# Patient Record
Sex: MALE | Race: BLACK OR AFRICAN AMERICAN | Employment: UNEMPLOYED | URBAN - METROPOLITAN AREA
[De-identification: names, ages, dates, MRNs, and addresses within clinical notes are randomized per-mention and may not be internally consistent; named-entity substitution may affect disease eponyms.]

---

## 2018-03-24 ENCOUNTER — APPOINTMENT (OUTPATIENT)
Dept: RADIOLOGY | Facility: MEDICAL CENTER | Age: 63
DRG: 637 | End: 2018-03-24
Attending: EMERGENCY MEDICINE
Payer: MEDICAID

## 2018-03-24 ENCOUNTER — HOSPITAL ENCOUNTER (INPATIENT)
Facility: MEDICAL CENTER | Age: 63
LOS: 3 days | DRG: 637 | End: 2018-03-27
Attending: EMERGENCY MEDICINE | Admitting: HOSPITALIST
Payer: MEDICAID

## 2018-03-24 ENCOUNTER — RESOLUTE PROFESSIONAL BILLING HOSPITAL PROF FEE (OUTPATIENT)
Dept: HOSPITALIST | Facility: MEDICAL CENTER | Age: 63
End: 2018-03-24
Payer: MEDICAID

## 2018-03-24 DIAGNOSIS — R73.9 HYPERGLYCEMIA: ICD-10-CM

## 2018-03-24 DIAGNOSIS — R20.0 RIGHT ARM NUMBNESS: ICD-10-CM

## 2018-03-24 LAB
ALBUMIN SERPL BCP-MCNC: 4.4 G/DL (ref 3.2–4.9)
ALBUMIN/GLOB SERPL: 1.4 G/DL
ALP SERPL-CCNC: 100 U/L (ref 30–99)
ALT SERPL-CCNC: 20 U/L (ref 2–50)
ANION GAP SERPL CALC-SCNC: 13 MMOL/L (ref 0–11.9)
APPEARANCE UR: CLEAR
APTT PPP: 23.8 SEC (ref 24.7–36)
AST SERPL-CCNC: 13 U/L (ref 12–45)
B-OH-BUTYR SERPL-MCNC: 1.24 MMOL/L (ref 0.02–0.27)
BASOPHILS # BLD AUTO: 0.5 % (ref 0–1.8)
BASOPHILS # BLD: 0.06 K/UL (ref 0–0.12)
BILIRUB SERPL-MCNC: 0.5 MG/DL (ref 0.1–1.5)
BILIRUB UR QL STRIP.AUTO: NEGATIVE
BUN SERPL-MCNC: 12 MG/DL (ref 8–22)
CALCIUM SERPL-MCNC: 9.5 MG/DL (ref 8.5–10.5)
CHLORIDE SERPL-SCNC: 90 MMOL/L (ref 96–112)
CO2 SERPL-SCNC: 20 MMOL/L (ref 20–33)
COLOR UR: YELLOW
CREAT SERPL-MCNC: 1.03 MG/DL (ref 0.5–1.4)
CULTURE IF INDICATED INDCX: NO UA CULTURE
EKG IMPRESSION: NORMAL
EOSINOPHIL # BLD AUTO: 0.05 K/UL (ref 0–0.51)
EOSINOPHIL NFR BLD: 0.5 % (ref 0–6.9)
ERYTHROCYTE [DISTWIDTH] IN BLOOD BY AUTOMATED COUNT: 41.6 FL (ref 35.9–50)
EST. AVERAGE GLUCOSE BLD GHB EST-MCNC: 413 MG/DL
GLOBULIN SER CALC-MCNC: 3.2 G/DL (ref 1.9–3.5)
GLUCOSE BLD-MCNC: 377 MG/DL (ref 65–99)
GLUCOSE BLD-MCNC: 384 MG/DL (ref 65–99)
GLUCOSE SERPL-MCNC: 995 MG/DL (ref 65–99)
GLUCOSE UR STRIP.AUTO-MCNC: >=1000 MG/DL
HBA1C MFR BLD: 16 % (ref 0–5.6)
HCT VFR BLD AUTO: 46.5 % (ref 42–52)
HGB BLD-MCNC: 15.5 G/DL (ref 14–18)
IMM GRANULOCYTES # BLD AUTO: 0.04 K/UL (ref 0–0.11)
IMM GRANULOCYTES NFR BLD AUTO: 0.4 % (ref 0–0.9)
INR PPP: 0.95 (ref 0.87–1.13)
KETONES UR STRIP.AUTO-MCNC: ABNORMAL MG/DL
LEUKOCYTE ESTERASE UR QL STRIP.AUTO: NEGATIVE
LYMPHOCYTES # BLD AUTO: 3.01 K/UL (ref 1–4.8)
LYMPHOCYTES NFR BLD: 27.1 % (ref 22–41)
MCH RBC QN AUTO: 31.4 PG (ref 27–33)
MCHC RBC AUTO-ENTMCNC: 33.3 G/DL (ref 33.7–35.3)
MCV RBC AUTO: 94.3 FL (ref 81.4–97.8)
MICRO URNS: ABNORMAL
MONOCYTES # BLD AUTO: 0.85 K/UL (ref 0–0.85)
MONOCYTES NFR BLD AUTO: 7.7 % (ref 0–13.4)
NEUTROPHILS # BLD AUTO: 7.1 K/UL (ref 1.82–7.42)
NEUTROPHILS NFR BLD: 63.8 % (ref 44–72)
NITRITE UR QL STRIP.AUTO: NEGATIVE
NRBC # BLD AUTO: 0 K/UL
NRBC BLD-RTO: 0 /100 WBC
PH UR STRIP.AUTO: 5 [PH]
PLATELET # BLD AUTO: 133 K/UL (ref 164–446)
PMV BLD AUTO: 14.5 FL (ref 9–12.9)
POTASSIUM SERPL-SCNC: 4.6 MMOL/L (ref 3.6–5.5)
PROT SERPL-MCNC: 7.6 G/DL (ref 6–8.2)
PROT UR QL STRIP: NEGATIVE MG/DL
PROTHROMBIN TIME: 12.4 SEC (ref 12–14.6)
RBC # BLD AUTO: 4.93 M/UL (ref 4.7–6.1)
RBC UR QL AUTO: NEGATIVE
SODIUM SERPL-SCNC: 123 MMOL/L (ref 135–145)
SP GR UR STRIP.AUTO: 1.04
TROPONIN I SERPL-MCNC: <0.01 NG/ML (ref 0–0.04)
TSH SERPL DL<=0.005 MIU/L-ACNC: 1.31 UIU/ML (ref 0.38–5.33)
UROBILINOGEN UR STRIP.AUTO-MCNC: 0.2 MG/DL
WBC # BLD AUTO: 11.1 K/UL (ref 4.8–10.8)

## 2018-03-24 PROCEDURE — 84484 ASSAY OF TROPONIN QUANT: CPT

## 2018-03-24 PROCEDURE — 85025 COMPLETE CBC W/AUTO DIFF WBC: CPT | Mod: 91

## 2018-03-24 PROCEDURE — 80053 COMPREHEN METABOLIC PANEL: CPT

## 2018-03-24 PROCEDURE — 770020 HCHG ROOM/CARE - TELE (206)

## 2018-03-24 PROCEDURE — 99285 EMERGENCY DEPT VISIT HI MDM: CPT

## 2018-03-24 PROCEDURE — 82962 GLUCOSE BLOOD TEST: CPT

## 2018-03-24 PROCEDURE — 700102 HCHG RX REV CODE 250 W/ 637 OVERRIDE(OP): Performed by: HOSPITALIST

## 2018-03-24 PROCEDURE — 96374 THER/PROPH/DIAG INJ IV PUSH: CPT

## 2018-03-24 PROCEDURE — 99223 1ST HOSP IP/OBS HIGH 75: CPT | Performed by: HOSPITALIST

## 2018-03-24 PROCEDURE — 70498 CT ANGIOGRAPHY NECK: CPT

## 2018-03-24 PROCEDURE — 700117 HCHG RX CONTRAST REV CODE 255: Performed by: EMERGENCY MEDICINE

## 2018-03-24 PROCEDURE — 85610 PROTHROMBIN TIME: CPT

## 2018-03-24 PROCEDURE — 700102 HCHG RX REV CODE 250 W/ 637 OVERRIDE(OP): Performed by: EMERGENCY MEDICINE

## 2018-03-24 PROCEDURE — 83036 HEMOGLOBIN GLYCOSYLATED A1C: CPT

## 2018-03-24 PROCEDURE — 36415 COLL VENOUS BLD VENIPUNCTURE: CPT

## 2018-03-24 PROCEDURE — 80048 BASIC METABOLIC PNL TOTAL CA: CPT

## 2018-03-24 PROCEDURE — 85730 THROMBOPLASTIN TIME PARTIAL: CPT

## 2018-03-24 PROCEDURE — 700111 HCHG RX REV CODE 636 W/ 250 OVERRIDE (IP): Performed by: HOSPITALIST

## 2018-03-24 PROCEDURE — 0042T CT-CEREBRAL PERFUSION ANALYSIS: CPT

## 2018-03-24 PROCEDURE — 81003 URINALYSIS AUTO W/O SCOPE: CPT

## 2018-03-24 PROCEDURE — 70450 CT HEAD/BRAIN W/O DYE: CPT

## 2018-03-24 PROCEDURE — 700105 HCHG RX REV CODE 258: Performed by: EMERGENCY MEDICINE

## 2018-03-24 PROCEDURE — 84443 ASSAY THYROID STIM HORMONE: CPT

## 2018-03-24 PROCEDURE — A9270 NON-COVERED ITEM OR SERVICE: HCPCS | Performed by: HOSPITALIST

## 2018-03-24 PROCEDURE — 82010 KETONE BODYS QUAN: CPT

## 2018-03-24 PROCEDURE — 93005 ELECTROCARDIOGRAM TRACING: CPT | Performed by: EMERGENCY MEDICINE

## 2018-03-24 PROCEDURE — 700105 HCHG RX REV CODE 258: Performed by: HOSPITALIST

## 2018-03-24 PROCEDURE — 80061 LIPID PANEL: CPT

## 2018-03-24 PROCEDURE — 71045 X-RAY EXAM CHEST 1 VIEW: CPT

## 2018-03-24 PROCEDURE — 96361 HYDRATE IV INFUSION ADD-ON: CPT

## 2018-03-24 PROCEDURE — 70496 CT ANGIOGRAPHY HEAD: CPT

## 2018-03-24 RX ORDER — HEPARIN SODIUM 5000 [USP'U]/ML
5000 INJECTION, SOLUTION INTRAVENOUS; SUBCUTANEOUS EVERY 8 HOURS
Status: DISCONTINUED | OUTPATIENT
Start: 2018-03-24 | End: 2018-03-27 | Stop reason: HOSPADM

## 2018-03-24 RX ORDER — HYDRALAZINE HYDROCHLORIDE 20 MG/ML
20 INJECTION INTRAMUSCULAR; INTRAVENOUS EVERY 6 HOURS PRN
Status: DISCONTINUED | OUTPATIENT
Start: 2018-03-24 | End: 2018-03-27 | Stop reason: HOSPADM

## 2018-03-24 RX ORDER — GABAPENTIN 300 MG/1
300 CAPSULE ORAL 3 TIMES DAILY
COMMUNITY

## 2018-03-24 RX ORDER — ERGOCALCIFEROL 1.25 MG/1
50000 CAPSULE ORAL
COMMUNITY

## 2018-03-24 RX ORDER — PROMETHAZINE HYDROCHLORIDE 25 MG/1
12.5-25 TABLET ORAL EVERY 4 HOURS PRN
Status: DISCONTINUED | OUTPATIENT
Start: 2018-03-24 | End: 2018-03-27 | Stop reason: HOSPADM

## 2018-03-24 RX ORDER — METOPROLOL SUCCINATE 100 MG/1
100 TABLET, EXTENDED RELEASE ORAL DAILY
COMMUNITY

## 2018-03-24 RX ORDER — ATORVASTATIN CALCIUM 40 MG/1
40 TABLET, FILM COATED ORAL NIGHTLY
COMMUNITY

## 2018-03-24 RX ORDER — POLYETHYLENE GLYCOL 3350 17 G/17G
1 POWDER, FOR SOLUTION ORAL
Status: DISCONTINUED | OUTPATIENT
Start: 2018-03-24 | End: 2018-03-27 | Stop reason: HOSPADM

## 2018-03-24 RX ORDER — METOPROLOL TARTRATE 50 MG/1
50 TABLET, FILM COATED ORAL TWICE DAILY
Status: DISCONTINUED | OUTPATIENT
Start: 2018-03-24 | End: 2018-03-25

## 2018-03-24 RX ORDER — ACETAMINOPHEN 325 MG/1
650 TABLET ORAL EVERY 6 HOURS PRN
Status: DISCONTINUED | OUTPATIENT
Start: 2018-03-24 | End: 2018-03-27 | Stop reason: HOSPADM

## 2018-03-24 RX ORDER — LISINOPRIL 10 MG/1
10 TABLET ORAL DAILY
Status: ON HOLD | COMMUNITY
End: 2018-03-27

## 2018-03-24 RX ORDER — NICOTINE 21 MG/24HR
14 PATCH, TRANSDERMAL 24 HOURS TRANSDERMAL
Status: DISCONTINUED | OUTPATIENT
Start: 2018-03-24 | End: 2018-03-27 | Stop reason: HOSPADM

## 2018-03-24 RX ORDER — BISACODYL 10 MG
10 SUPPOSITORY, RECTAL RECTAL
Status: DISCONTINUED | OUTPATIENT
Start: 2018-03-24 | End: 2018-03-27 | Stop reason: HOSPADM

## 2018-03-24 RX ORDER — PROMETHAZINE HYDROCHLORIDE 25 MG/1
12.5-25 SUPPOSITORY RECTAL EVERY 4 HOURS PRN
Status: DISCONTINUED | OUTPATIENT
Start: 2018-03-24 | End: 2018-03-27 | Stop reason: HOSPADM

## 2018-03-24 RX ORDER — AMOXICILLIN 250 MG
2 CAPSULE ORAL 2 TIMES DAILY
Status: DISCONTINUED | OUTPATIENT
Start: 2018-03-24 | End: 2018-03-27 | Stop reason: HOSPADM

## 2018-03-24 RX ORDER — SODIUM CHLORIDE 9 MG/ML
INJECTION, SOLUTION INTRAVENOUS CONTINUOUS
Status: DISCONTINUED | OUTPATIENT
Start: 2018-03-24 | End: 2018-03-24

## 2018-03-24 RX ORDER — ONDANSETRON 2 MG/ML
4 INJECTION INTRAMUSCULAR; INTRAVENOUS EVERY 4 HOURS PRN
Status: DISCONTINUED | OUTPATIENT
Start: 2018-03-24 | End: 2018-03-27 | Stop reason: HOSPADM

## 2018-03-24 RX ORDER — SODIUM CHLORIDE 9 MG/ML
1000 INJECTION, SOLUTION INTRAVENOUS ONCE
Status: COMPLETED | OUTPATIENT
Start: 2018-03-24 | End: 2018-03-24

## 2018-03-24 RX ORDER — DEXTROSE MONOHYDRATE 25 G/50ML
25 INJECTION, SOLUTION INTRAVENOUS
Status: DISCONTINUED | OUTPATIENT
Start: 2018-03-24 | End: 2018-03-27 | Stop reason: HOSPADM

## 2018-03-24 RX ORDER — ONDANSETRON 4 MG/1
4 TABLET, ORALLY DISINTEGRATING ORAL EVERY 4 HOURS PRN
Status: DISCONTINUED | OUTPATIENT
Start: 2018-03-24 | End: 2018-03-27 | Stop reason: HOSPADM

## 2018-03-24 RX ORDER — SODIUM CHLORIDE 9 MG/ML
INJECTION, SOLUTION INTRAVENOUS CONTINUOUS
Status: DISCONTINUED | OUTPATIENT
Start: 2018-03-24 | End: 2018-03-27 | Stop reason: HOSPADM

## 2018-03-24 RX ADMIN — HEPARIN SODIUM 5000 UNITS: 5000 INJECTION, SOLUTION INTRAVENOUS; SUBCUTANEOUS at 22:00

## 2018-03-24 RX ADMIN — SODIUM CHLORIDE 1000 ML: 9 INJECTION, SOLUTION INTRAVENOUS at 12:50

## 2018-03-24 RX ADMIN — INSULIN HUMAN 5 UNITS: 100 INJECTION, SOLUTION PARENTERAL at 21:00

## 2018-03-24 RX ADMIN — IOHEXOL 120 ML: 350 INJECTION, SOLUTION INTRAVENOUS at 14:24

## 2018-03-24 RX ADMIN — SODIUM CHLORIDE: 9 INJECTION, SOLUTION INTRAVENOUS at 18:08

## 2018-03-24 RX ADMIN — SODIUM CHLORIDE 1000 ML: 9 INJECTION, SOLUTION INTRAVENOUS at 14:42

## 2018-03-24 RX ADMIN — METOPROLOL TARTRATE 50 MG: 50 TABLET, FILM COATED ORAL at 18:08

## 2018-03-24 RX ADMIN — INSULIN HUMAN 10 UNITS: 100 INJECTION, SOLUTION PARENTERAL at 14:41

## 2018-03-24 ASSESSMENT — LIFESTYLE VARIABLES
EVER_SMOKED: YES
EVER_SMOKED: NEVER
ALCOHOL_USE: NO

## 2018-03-24 ASSESSMENT — PATIENT HEALTH QUESTIONNAIRE - PHQ9
SUM OF ALL RESPONSES TO PHQ9 QUESTIONS 1 AND 2: 0
2. FEELING DOWN, DEPRESSED, IRRITABLE, OR HOPELESS: NOT AT ALL
1. LITTLE INTEREST OR PLEASURE IN DOING THINGS: NOT AT ALL

## 2018-03-24 ASSESSMENT — COPD QUESTIONNAIRES
COPD SCREENING SCORE: 2
DO YOU EVER COUGH UP ANY MUCUS OR PHLEGM?: NO/ONLY WITH OCCASIONAL COLDS OR INFECTIONS
HAVE YOU SMOKED AT LEAST 100 CIGARETTES IN YOUR ENTIRE LIFE: NO/DON'T KNOW
DURING THE PAST 4 WEEKS HOW MUCH DID YOU FEEL SHORT OF BREATH: NONE/LITTLE OF THE TIME

## 2018-03-24 NOTE — ED PROVIDER NOTES
ED Provider Note    CHIEF COMPLAINT  Chief Complaint   Patient presents with   • Numbness       HPI  Jaziel Aguilar is a 62 y.o. male who presents to the emergency department complaining of right arm numbness and weakness. The patient noticed the symptoms around 5 AM and he said initially his arm also felt somewhat weak but mostly just felt numb and tingly and this has persisted although the weakness seems to have gone away the arm is still feeling numb and tingly. He does not recognize any specific exacerbating or relieving factors or precipitating events. The patient does have a history of diabetes and says that he has been very thirsty and is urinating frequently but he does not know his blood sugar has been.    REVIEW OF SYSTEMS no trauma to the head or neck no headache or neck pain no chest pain or difficulty breathing he did not have any symptoms in the face or the left side of the body or the right leg. All other systems negative    PAST MEDICAL HISTORY  History reviewed. No pertinent past medical history.    FAMILY HISTORY  History reviewed. No pertinent family history.    SOCIAL HISTORY  Social History     Social History   • Marital status: Single     Spouse name: N/A   • Number of children: N/A   • Years of education: N/A     Social History Main Topics   • Smoking status: Current Every Day Smoker     Types: Cigarettes   • Smokeless tobacco: Never Used   • Alcohol use Yes      Comment: occ   • Drug use: No   • Sexual activity: Not on file     Other Topics Concern   • Not on file     Social History Narrative   • No narrative on file       SURGICAL HISTORY  Past Surgical History:   Procedure Laterality Date   • INGUINAL HERNIA REPAIR  6/4/08    Performed by YAIR PHAM at SURGERY SAME DAY [facility] ORS   • HYDROCELECTOMY ADULT  6/4/08    Performed by YAIR PHAM at SURGERY SAME DAY [facility] ORS   • INGUINAL HERNIA REPAIR  6/4/08    Performed by YAIR PHAM at SURGERY SAME DAY [facility] ORS   •  "HYDROCELECTOMY ADULT  6/4/08    Performed by YAIR PHAM at SURGERY SAME DAY [facility] ORS       CURRENT MEDICATIONS  Home Medications     Reviewed by Marsha Ponce, Pharmacy Intern (Pharmacy Intern) on 03/24/18 at 1546  Med List Status: Complete   Medication Last Dose Status   atorvastatin (LIPITOR) 40 MG Tab 3/22/2018 Active   gabapentin (NEURONTIN) 300 MG Cap 3/23/2018 Active   lisinopril (PRINIVIL) 10 MG Tab 3/23/2018 Active   metformin (GLUCOPHAGE) 1000 MG tablet 3/23/2018 Active   metoprolol SR (TOPROL XL) 100 MG TABLET SR 24 HR 3/23/2018 Active   vitamin D, Ergocalciferol, (DRISDOL) 12843 units Cap capsule 3/18/2018 Active                ALLERGIES  Allergies   Allergen Reactions   • Augmentin Unspecified     Pt states he felt dizzy and lethargic        PHYSICAL EXAM  VITAL SIGNS: BP (!) 166/90   Pulse 95   Temp 36.7 °C (98.1 °F)   Resp 18   Ht 1.753 m (5' 9\")   Wt (!) 136.1 kg (300 lb)   SpO2 96%   BMI 44.30 kg/m²    Oxygen saturation is interpreted as adequate  Constitutional: Awake and nontoxic appearing  HENT: Mucous membranes are dry  Eyes: No erythema or discharge or jaundice, extraocular motion is present without difficulty  Neck: Trachea midline no JVD  Cardiovascular: Regular rate and rhythm  Lungs: Clear and equal bilaterally  Abdomen/Back: Obese soft nontender no rebound guarding or peritoneal findings  Skin: Warm and dry  Musculoskeletal: No acute bony deformity  Neurologic: The patient is awake and verbal speaking in full complete clear sentences with no apparent difficulty. The face moves normally. The patient has good strength and coordination in the lower extremities with no drift and sensation is intact to light touch. The patient has equal strength in the upper extremities and there is no drift but subjectively he has diminished sensitivity to touch throughout the entire right upper extremity.    Laboratory  CBC shows white blood cell count of 11.1 hemoglobin adequate at 15.5 " basic metabolic panel showed a very elevated blood sugar of 995 with the bicarb at the lower limits of normal at 20 and a minimally elevated anion gap of 13. Beta hydroxybutyric acid level is elevated at 1.24. Troponin is normal less than 0.01 INR 0.95 urinalysis is negative for nitrite and leukocyte esterase and blood although there are trace ketones    EKG interpretation  12-lead EKG showed sinus rhythm 92 bpm there is a left axis deviation there is no pathologic ST elevation or depression or ectopy    Radiology  CT-CTA NECK WITH & W/O-POST PROCESSING   Final Result      1.  Patent carotid and vertebral arteries.      2.  Minimal atherosclerotic plaque at the carotid bifurcations bilaterally.      CT-CTA HEAD WITH & W/O-POST PROCESS   Final Result         1. No hemodynamically significant narrowing of the major intracranial vessels.      CT-CEREBRAL PERFUSION ANALYSIS   Final Result      1.  CT perfusion examination over the limited section of brain reveals 0 mL of brain parenchyma has less than 30% of cerebral blood flow (CBF).      2.  Please note that the cerebral perfusion was performed on the limited brain tissue around the basal ganglia region. Infarct/ischemia outside the CT perfusion sections can be missed in this study.      CT-HEAD W/O   Final Result         1. No acute intracranial abnormality. No evidence of acute intracranial hemorrhage or mass lesion.            2. Please note, hyperacute ischemia can remain occult on CT. If ischemia is clinically suspected, MRI is suggested for further evaluation.      DX-CHEST-PORTABLE (1 VIEW)   Final Result      No evidence of acute cardiopulmonary process.        MEDICAL DECISION MAKING and DISPOSITION  In the emergency department an IV was established patient was given intravenous fluids and subcutaneous insulin. I've reviewed all the findings with the patient and his wife and have reviewed the case with the hospitalist and the patient is admitted for further  evaluation and treatment. The differential diagnosis includes CVA or TIA however the patient's symptoms are greatly improved and he is outside the time window for thrombolytic therapy and has a very low NIH score therefore thrombolytics are not administered.    IMPRESSION  1. Hyperglycemia with ketosis  2. Right arm numbness      Electronically signed by: Hill Palacios, 3/24/2018 4:24 PM

## 2018-03-24 NOTE — ED NOTES
Assumed care of pt from waiting room. C/o R arm tingling x 0500. Mild weakness. No other neuro deficits. Speech clear, no facial droop noted. Hooked to monitor - VSS. Fall precautions in place. Call bell in reach. Wife at bedside. Ongoing monitoring.

## 2018-03-24 NOTE — H&P
Hospital Medicine History and Physical    Date of Service  3/25/2018    Chief Complaint  Chief Complaint   Patient presents with   • Numbness       History of Presenting Illness  62 y.o. male who presented 3/24/2018 with prior history of diabetes mellitus, family history significant for type II diabetes presents with one-week history of extreme thirst excessive urination, decreased appetite. The patient states that he drink 2 shots of alcohol last night and fell asleep he awoke with right arm complete numbness inability to move it at 0500 while lying in bed upon awakening. He states that he does drink alcohol but never more than 2 drinks a day he currently lives with his fiancée. He recently was treated with 9 days of Augmentin for a sinus infection. He does have obstructive sleep apnea on CPAP which he is compliant. He states that he is now able to move his right arm however earlier today it dropped at his side only. His tingling numbness also is improving. He denies any other neurological deficits.   Primary Care Physician  Anil Gaming M.D. (Inactive)    Consultants  none    Code Status  full    Review of Systems  Review of Systems   Constitutional: Positive for malaise/fatigue. Negative for chills, diaphoresis and fever.   HENT: Negative for congestion, ear discharge, ear pain, hearing loss, nosebleeds, sinus pain, sore throat and tinnitus.    Eyes: Negative for pain and discharge.   Respiratory: Negative for cough, hemoptysis, sputum production, shortness of breath, wheezing and stridor.    Cardiovascular: Negative for chest pain, palpitations, claudication and leg swelling.   Gastrointestinal: Negative for abdominal pain, constipation, diarrhea, melena, nausea and vomiting.   Genitourinary: Positive for frequency. Negative for dysuria and urgency.   Musculoskeletal: Negative for back pain, joint pain, myalgias and neck pain.   Skin: Negative for itching and rash.   Neurological: Positive for tingling and  focal weakness (right arm). Negative for dizziness, sensory change, speech change, loss of consciousness, weakness and headaches.   Endo/Heme/Allergies: Does not bruise/bleed easily.   Psychiatric/Behavioral: Negative for depression, substance abuse and suicidal ideas.        Past Medical History  Past Medical History:   Diagnosis Date   • Acute sinus infection    • Obstructive sleep apnea on CPAP        Surgical History  Past Surgical History:   Procedure Laterality Date   • INGUINAL HERNIA REPAIR  08    Performed by YAIR PHAM at SURGERY SAME DAY [facility] ORS   • HYDROCELECTOMY ADULT  08    Performed by YAIR PHAM at SURGERY SAME DAY [facility]   • INGUINAL HERNIA REPAIR  08    Performed by YAIR PHAM at SURGERY SAME DAY [facility]   • HYDROCELECTOMY ADULT  08    Performed by YAIR PHAM at SURGERY SAME DAY [facility]       Medications  Augmentin for 9 days    Family History  Family History   Problem Relation Age of Onset   • Diabetes Mother    • Diabetes Sister    • Diabetes Brother        Social History  Social History   Substance Use Topics   • Smoking status: Current Every Day Smoker     Types: Cigarettes   • Smokeless tobacco: Never Used   • Alcohol use Yes      Comment: occ       Allergies  Allergies   Allergen Reactions   • Augmentin Unspecified     Pt states he felt dizzy and lethargic         Physical Exam  Laboratory   Hemodynamics  Temp (24hrs), Av.6 °C (97.9 °F), Min:35.9 °C (96.6 °F), Max:37.2 °C (99 °F)   Temperature: 37.2 °C (99 °F)  Pulse  Av  Min: 68  Max: 118    Blood Pressure: 123/82, NIBP: 133/78      Respiratory      Respiration: 18, Pulse Oximetry: 99 %, O2 Daily Delivery Respiratory : Room Air with O2 Available     Work Of Breathing / Effort: Mild  RUL Breath Sounds: Clear, RML Breath Sounds: Clear, RLL Breath Sounds: Clear, OSMAR Breath Sounds: Clear, LLL Breath Sounds: Clear    Physical Exam   Constitutional: He is oriented to person, place, and  time. He appears well-developed and well-nourished. No distress.   HENT:   Head: Normocephalic and atraumatic.   Mouth/Throat: No oropharyngeal exudate.   Dry mucous membranes   Eyes: Conjunctivae and EOM are normal. Pupils are equal, round, and reactive to light. Right eye exhibits no discharge. Left eye exhibits no discharge. No scleral icterus.   Neck: Normal range of motion. Neck supple. No JVD present. No tracheal deviation present. No thyromegaly present.   Cardiovascular: Normal rate, regular rhythm and normal heart sounds.  Exam reveals no gallop and no friction rub.    No murmur heard.  Pulmonary/Chest: Effort normal and breath sounds normal. No respiratory distress. He has no wheezes. He has no rales. He exhibits no tenderness.   Abdominal: Soft. Bowel sounds are normal. He exhibits no distension and no mass. There is no tenderness. There is no rebound and no guarding.   Musculoskeletal: Normal range of motion. He exhibits no edema or tenderness.   Lymphadenopathy:     He has no cervical adenopathy.   Neurological: He is alert and oriented to person, place, and time. No cranial nerve deficit. He exhibits normal muscle tone.   Recovered range of motion of right upper arm. States mild tingling numbness to dorsum of the forearm. 2+ radial pulse   Skin: Skin is warm and dry. No rash noted. He is not diaphoretic. No erythema.   Poor skin turgor   Psychiatric: He has a normal mood and affect. His behavior is normal. Judgment and thought content normal.   Nursing note and vitals reviewed.      Recent Labs      03/24/18   1245  03/24/18   2356   WBC  11.1*  9.1   RBC  4.93  4.50*   HEMOGLOBIN  15.5  14.2   HEMATOCRIT  46.5  40.8*   MCV  94.3  90.7   MCH  31.4  31.6   MCHC  33.3*  34.8   RDW  41.6  38.8   PLATELETCT  133*  115*   MPV  14.5*  14.3*     Recent Labs      03/24/18   1245  03/24/18   2356   SODIUM  123*  135   POTASSIUM  4.6  4.0   CHLORIDE  90*  103   CO2  20  22   GLUCOSE  995*  447*   BUN  12  10    CREATININE  1.03  0.77   CALCIUM  9.5  8.9     Recent Labs      03/24/18   1245  03/24/18   2356   ALTSGPT  20   --    ASTSGOT  13   --    ALKPHOSPHAT  100*   --    TBILIRUBIN  0.5   --    GLUCOSE  995*  447*     Recent Labs      03/24/18   1245   APTT  23.8*   INR  0.95         Recent Labs      03/24/18   2356   TRIGLYCERIDE  220*   HDL  22*   LDL  67     Lab Results   Component Value Date    TROPONINI <0.01 03/24/2018     Urinalysis:    Lab Results  Component Value Date/Time   SPECGRAVITY 1.038 03/24/2018 1240   GLUCOSEUR >=1000 (A) 03/24/2018 1240   KETONES Trace (A) 03/24/2018 1240   NITRITE Negative 03/24/2018 1240        Imaging  Chest x-ray negative   CT head without contrast negative   CTA head and neck negative    Assessment/Plan     I anticipate this patient will require at least two midnights for appropriate medical management, necessitating inpatient admission.    * Acute hyperglycemia- (present on admission)   Assessment & Plan    Glucose level 995 with beta hydroxy butyric acid positive.  New-onset type II diabetes mellitus.  Received 2 L IV fluids, continue normal saline 150 an hour.  10 units IV insulin.  Sliding scale insulin ordered. Diabetic education and nutrition consult.  Diabetic diet.  Mild DKA with anion gap of 13 CO2 of 20.  Therefore no ICU admission.        Uncontrolled hypertension- (present on admission)   Assessment & Plan    Started metoprolol 50 mg by mouth twice a day. No home meds reported.  Hydralazine when necessary IV        Dehydration, severe- (present on admission)   Assessment & Plan    Continue IV fluids at 150 hour.  Repeat BMP in 6 hours ×2.        New onset type 2 diabetes mellitus (CMS-HCC)- (present on admission)   Assessment & Plan    Check hemoglobin A1c.  Diabetic education as above.  Start oral hypoglycemic medications.          Saturday night palsy, right- (present on admission)   Assessment & Plan    Rapidly improving complete immobilization of right  arm  History of alcoholic drinks a night before.  Remaining numbness at the dorsum of the forearm only.  CT head CT a head and neck normal. Ordered MRI without contrast of brain. No other deficits on exam.            VTE prophylaxis: Heparin .

## 2018-03-24 NOTE — ED NOTES
Pt resting in stretcher watching tv. VSS on monitor. Resp even and unlabored. NAD. Wife at bedside. Ongoing monitoring.

## 2018-03-24 NOTE — ED NOTES
Med rec complete per pt and verified home medications with home pharmacy   Allergies updated   Pt states he stopped taking his ABX because it made him feel dizzy and lethargic. Only had 2 pills left.   ABX was Augmentin 875 BID X 10 days

## 2018-03-24 NOTE — ED TRIAGE NOTES
Pt to triage via w/c c/o right sided arm numbness and inability to move since 0500. PT states he was laying in bed trying to sleep when first noticed. Pt states that he has slowly regained ability to move arm since then. Denies any weakness to any other extremities. denies pain A&Ox4 equal  equal smile. Denies sob denies c/p

## 2018-03-24 NOTE — ED NOTES
Pt resting in stretcher watching tv. VSS on monitor. Resp even and unlabored. NAD. Wife at bedside. Awaiting admission. Pt and wife aware of plan of care. Ongoing monitoring.

## 2018-03-24 NOTE — ED NOTES
Syed from Lab called with critical result of glucose 995 at 1349. Critical lab result read back to Syed.   Dr. Palacios notified of critical lab result at 1350.  Critical lab result read back by Dr. Palacios.

## 2018-03-25 ENCOUNTER — APPOINTMENT (OUTPATIENT)
Dept: RADIOLOGY | Facility: MEDICAL CENTER | Age: 63
DRG: 637 | End: 2018-03-25
Attending: HOSPITALIST
Payer: MEDICAID

## 2018-03-25 PROBLEM — G56.31: Status: ACTIVE | Noted: 2018-03-25

## 2018-03-25 PROBLEM — E66.01 MORBID OBESITY (HCC): Status: ACTIVE | Noted: 2018-03-25

## 2018-03-25 PROBLEM — R73.9 ACUTE HYPERGLYCEMIA: Status: ACTIVE | Noted: 2018-03-25

## 2018-03-25 PROBLEM — E11.9 NEW ONSET TYPE 2 DIABETES MELLITUS (HCC): Status: ACTIVE | Noted: 2018-03-25

## 2018-03-25 PROBLEM — G47.33 OSA (OBSTRUCTIVE SLEEP APNEA): Status: ACTIVE | Noted: 2018-03-25

## 2018-03-25 PROBLEM — E11.00 HYPEROSMOLAR NON-KETOTIC STATE IN PATIENT WITH TYPE 2 DIABETES MELLITUS (HCC): Status: ACTIVE | Noted: 2018-03-25

## 2018-03-25 PROBLEM — I10 UNCONTROLLED HYPERTENSION: Status: ACTIVE | Noted: 2018-03-25

## 2018-03-25 PROBLEM — E86.0 DEHYDRATION, SEVERE: Status: ACTIVE | Noted: 2018-03-25

## 2018-03-25 LAB
ANION GAP SERPL CALC-SCNC: 10 MMOL/L (ref 0–11.9)
BASOPHILS # BLD AUTO: 0.7 % (ref 0–1.8)
BASOPHILS # BLD: 0.06 K/UL (ref 0–0.12)
BUN SERPL-MCNC: 10 MG/DL (ref 8–22)
CALCIUM SERPL-MCNC: 8.9 MG/DL (ref 8.5–10.5)
CHLORIDE SERPL-SCNC: 103 MMOL/L (ref 96–112)
CHOLEST SERPL-MCNC: 133 MG/DL (ref 100–199)
CO2 SERPL-SCNC: 22 MMOL/L (ref 20–33)
CREAT SERPL-MCNC: 0.77 MG/DL (ref 0.5–1.4)
EOSINOPHIL # BLD AUTO: 0.09 K/UL (ref 0–0.51)
EOSINOPHIL NFR BLD: 1 % (ref 0–6.9)
ERYTHROCYTE [DISTWIDTH] IN BLOOD BY AUTOMATED COUNT: 38.8 FL (ref 35.9–50)
GLUCOSE BLD-MCNC: 271 MG/DL (ref 65–99)
GLUCOSE BLD-MCNC: 273 MG/DL (ref 65–99)
GLUCOSE BLD-MCNC: 303 MG/DL (ref 65–99)
GLUCOSE BLD-MCNC: 305 MG/DL (ref 65–99)
GLUCOSE SERPL-MCNC: 447 MG/DL (ref 65–99)
HCT VFR BLD AUTO: 40.8 % (ref 42–52)
HDLC SERPL-MCNC: 22 MG/DL
HGB BLD-MCNC: 14.2 G/DL (ref 14–18)
IMM GRANULOCYTES # BLD AUTO: 0.02 K/UL (ref 0–0.11)
IMM GRANULOCYTES NFR BLD AUTO: 0.2 % (ref 0–0.9)
LDLC SERPL CALC-MCNC: 67 MG/DL
LYMPHOCYTES # BLD AUTO: 3.74 K/UL (ref 1–4.8)
LYMPHOCYTES NFR BLD: 41.1 % (ref 22–41)
MCH RBC QN AUTO: 31.6 PG (ref 27–33)
MCHC RBC AUTO-ENTMCNC: 34.8 G/DL (ref 33.7–35.3)
MCV RBC AUTO: 90.7 FL (ref 81.4–97.8)
MONOCYTES # BLD AUTO: 0.73 K/UL (ref 0–0.85)
MONOCYTES NFR BLD AUTO: 8 % (ref 0–13.4)
NEUTROPHILS # BLD AUTO: 4.45 K/UL (ref 1.82–7.42)
NEUTROPHILS NFR BLD: 49 % (ref 44–72)
NRBC # BLD AUTO: 0 K/UL
NRBC BLD-RTO: 0 /100 WBC
PLATELET # BLD AUTO: 115 K/UL (ref 164–446)
PMV BLD AUTO: 14.3 FL (ref 9–12.9)
POTASSIUM SERPL-SCNC: 4 MMOL/L (ref 3.6–5.5)
RBC # BLD AUTO: 4.5 M/UL (ref 4.7–6.1)
SODIUM SERPL-SCNC: 135 MMOL/L (ref 135–145)
TRIGL SERPL-MCNC: 220 MG/DL (ref 0–149)
WBC # BLD AUTO: 9.1 K/UL (ref 4.8–10.8)

## 2018-03-25 PROCEDURE — 700105 HCHG RX REV CODE 258: Performed by: HOSPITALIST

## 2018-03-25 PROCEDURE — 70551 MRI BRAIN STEM W/O DYE: CPT

## 2018-03-25 PROCEDURE — A9270 NON-COVERED ITEM OR SERVICE: HCPCS | Performed by: HOSPITALIST

## 2018-03-25 PROCEDURE — 99233 SBSQ HOSP IP/OBS HIGH 50: CPT | Performed by: HOSPITALIST

## 2018-03-25 PROCEDURE — 700111 HCHG RX REV CODE 636 W/ 250 OVERRIDE (IP): Performed by: HOSPITALIST

## 2018-03-25 PROCEDURE — 82962 GLUCOSE BLOOD TEST: CPT

## 2018-03-25 PROCEDURE — 700102 HCHG RX REV CODE 250 W/ 637 OVERRIDE(OP): Performed by: HOSPITALIST

## 2018-03-25 PROCEDURE — 770006 HCHG ROOM/CARE - MED/SURG/GYN SEMI*

## 2018-03-25 RX ORDER — INSULIN GLARGINE 100 [IU]/ML
10 INJECTION, SOLUTION SUBCUTANEOUS EVERY EVENING
Status: DISCONTINUED | OUTPATIENT
Start: 2018-03-25 | End: 2018-03-26

## 2018-03-25 RX ORDER — GLIPIZIDE 5 MG/1
5 TABLET ORAL
Status: DISCONTINUED | OUTPATIENT
Start: 2018-03-25 | End: 2018-03-25

## 2018-03-25 RX ORDER — LISINOPRIL 5 MG/1
5 TABLET ORAL
Status: DISCONTINUED | OUTPATIENT
Start: 2018-03-25 | End: 2018-03-25

## 2018-03-25 RX ORDER — LISINOPRIL 20 MG/1
20 TABLET ORAL
Status: DISCONTINUED | OUTPATIENT
Start: 2018-03-25 | End: 2018-03-27 | Stop reason: HOSPADM

## 2018-03-25 RX ORDER — ASPIRIN 81 MG/1
81 TABLET, CHEWABLE ORAL DAILY
Status: DISCONTINUED | OUTPATIENT
Start: 2018-03-25 | End: 2018-03-26

## 2018-03-25 RX ORDER — ATORVASTATIN CALCIUM 40 MG/1
40 TABLET, FILM COATED ORAL
Status: DISCONTINUED | OUTPATIENT
Start: 2018-03-25 | End: 2018-03-27 | Stop reason: HOSPADM

## 2018-03-25 RX ADMIN — METOPROLOL TARTRATE 50 MG: 50 TABLET, FILM COATED ORAL at 09:02

## 2018-03-25 RX ADMIN — HEPARIN SODIUM 5000 UNITS: 5000 INJECTION, SOLUTION INTRAVENOUS; SUBCUTANEOUS at 06:02

## 2018-03-25 RX ADMIN — ASPIRIN 81 MG: 81 TABLET, CHEWABLE ORAL at 12:35

## 2018-03-25 RX ADMIN — NICOTINE 14 MG: 14 PATCH, EXTENDED RELEASE TRANSDERMAL at 06:03

## 2018-03-25 RX ADMIN — SODIUM CHLORIDE: 9 INJECTION, SOLUTION INTRAVENOUS at 01:49

## 2018-03-25 RX ADMIN — INSULIN HUMAN 4 UNITS: 100 INJECTION, SOLUTION PARENTERAL at 12:32

## 2018-03-25 RX ADMIN — METFORMIN HYDROCHLORIDE 500 MG: 500 TABLET ORAL at 09:02

## 2018-03-25 RX ADMIN — HEPARIN SODIUM 5000 UNITS: 5000 INJECTION, SOLUTION INTRAVENOUS; SUBCUTANEOUS at 21:44

## 2018-03-25 RX ADMIN — INSULIN GLARGINE 10 UNITS: 100 INJECTION, SOLUTION SUBCUTANEOUS at 21:50

## 2018-03-25 RX ADMIN — GLIPIZIDE 5 MG: 5 TABLET ORAL at 06:02

## 2018-03-25 RX ADMIN — SODIUM CHLORIDE: 9 INJECTION, SOLUTION INTRAVENOUS at 08:44

## 2018-03-25 RX ADMIN — LISINOPRIL 20 MG: 20 TABLET ORAL at 12:35

## 2018-03-25 RX ADMIN — INSULIN HUMAN 3 UNITS: 100 INJECTION, SOLUTION PARENTERAL at 21:50

## 2018-03-25 RX ADMIN — INSULIN HUMAN 4 UNITS: 100 INJECTION, SOLUTION PARENTERAL at 06:08

## 2018-03-25 RX ADMIN — METFORMIN HYDROCHLORIDE 500 MG: 500 TABLET ORAL at 17:47

## 2018-03-25 RX ADMIN — ATORVASTATIN CALCIUM 40 MG: 40 TABLET, FILM COATED ORAL at 21:44

## 2018-03-25 RX ADMIN — INSULIN HUMAN 3 UNITS: 100 INJECTION, SOLUTION PARENTERAL at 17:46

## 2018-03-25 RX ADMIN — HEPARIN SODIUM 5000 UNITS: 5000 INJECTION, SOLUTION INTRAVENOUS; SUBCUTANEOUS at 14:00

## 2018-03-25 ASSESSMENT — ENCOUNTER SYMPTOMS
TINGLING: 1
DEPRESSION: 0
FOCAL WEAKNESS: 1
DIAPHORESIS: 0
ABDOMINAL PAIN: 0
TINGLING: 0
MYALGIAS: 0
SINUS PAIN: 0
WEAKNESS: 0
SHORTNESS OF BREATH: 0
DIZZINESS: 0
DIARRHEA: 0
PALPITATIONS: 0
COUGH: 0
SENSORY CHANGE: 0
FEVER: 0
LOSS OF CONSCIOUSNESS: 0
SPUTUM PRODUCTION: 0
BACK PAIN: 0
BLURRED VISION: 0
CLAUDICATION: 0
BRUISES/BLEEDS EASILY: 0
VOMITING: 0
CHILLS: 0
SPEECH CHANGE: 0
INSOMNIA: 0
NECK PAIN: 0
EYE DISCHARGE: 0
STRIDOR: 0
EYE PAIN: 0
SORE THROAT: 0
NAUSEA: 0
HEMOPTYSIS: 0
HEADACHES: 0
WHEEZING: 0
CONSTIPATION: 0

## 2018-03-25 ASSESSMENT — PAIN SCALES - GENERAL
PAINLEVEL_OUTOF10: 0

## 2018-03-25 ASSESSMENT — LIFESTYLE VARIABLES: SUBSTANCE_ABUSE: 0

## 2018-03-25 NOTE — PROGRESS NOTES
[facility] Hospitalist Progress Note    Date of Service: 3/25/2018    Chief Complaint  62 y.o. male admitted 3/24/2018 with polydypsea, polyuria and right arm numbness. He has been found to be a diabetic with evidence for HONC.     Interval Problem Update  No pain  Feeling improved  Right arm sx resolved.       Consultants/Specialty  none    Disposition  Suspect home        Review of Systems   Constitutional: Positive for malaise/fatigue. Negative for chills and fever.   HENT: Negative for sore throat.    Eyes: Negative for blurred vision and pain.   Respiratory: Negative for cough and shortness of breath.    Cardiovascular: Negative for chest pain and palpitations.   Gastrointestinal: Negative for abdominal pain, nausea and vomiting.   Genitourinary: Negative for dysuria and urgency.   Musculoskeletal: Negative for back pain and neck pain.   Skin: Negative for itching and rash.   Neurological: Negative for dizziness, tingling and headaches.   Psychiatric/Behavioral: Negative for depression. The patient does not have insomnia.    All other systems reviewed and are negative.     Physical Exam  Laboratory/Imaging   Hemodynamics  Temp (24hrs), Av.6 °C (97.8 °F), Min:35.9 °C (96.6 °F), Max:37.2 °C (99 °F)   Temperature: 36 °C (96.8 °F)  Pulse  Av.7  Min: 68  Max: 118    Blood Pressure: 129/88, NIBP: 133/78      Respiratory      Respiration: 18, Pulse Oximetry: 92 %, O2 Daily Delivery Respiratory : Room Air with O2 Available     Work Of Breathing / Effort: Mild  RUL Breath Sounds: Clear, RML Breath Sounds: Clear, RLL Breath Sounds: Clear, OSMAR Breath Sounds: Clear, LLL Breath Sounds: Clear    Fluids    Intake/Output Summary (Last 24 hours) at 18 1116  Last data filed at 18 1000   Gross per 24 hour   Intake             2640 ml   Output             1600 ml   Net             1040 ml       Nutrition  Orders Placed This Encounter   Procedures   • Diet Order     Standing Status:   Standing     Number of  Occurrences:   1     Order Specific Question:   Diet:     Answer:   Diabetic [3]     Physical Exam   Constitutional: He is oriented to person, place, and time. He appears well-developed and well-nourished. No distress.   HENT:   Right Ear: External ear normal.   Left Ear: External ear normal.   Nose: Nose normal.   Eyes: Right eye exhibits no discharge. Left eye exhibits no discharge. No scleral icterus.   Neck: No JVD present. No tracheal deviation present.   Cardiovascular: Normal rate, normal heart sounds and intact distal pulses.    No murmur heard.  Cap refill 2 sec   Pulmonary/Chest: Effort normal and breath sounds normal. No respiratory distress. He has no wheezes. He has no rales.   Abdominal: Soft. Bowel sounds are normal. He exhibits no distension. There is no tenderness. There is no guarding.   Musculoskeletal: He exhibits no edema or tenderness.   Neurological: He is alert and oriented to person, place, and time.   Skin: Skin is warm and dry. He is not diaphoretic. No erythema.   Normal skin color   Psychiatric: He has a normal mood and affect. His behavior is normal.   Nursing note and vitals reviewed.      Recent Labs      03/24/18   1245  03/24/18   2356   WBC  11.1*  9.1   RBC  4.93  4.50*   HEMOGLOBIN  15.5  14.2   HEMATOCRIT  46.5  40.8*   MCV  94.3  90.7   MCH  31.4  31.6   MCHC  33.3*  34.8   RDW  41.6  38.8   PLATELETCT  133*  115*   MPV  14.5*  14.3*     Recent Labs      03/24/18   1245  03/24/18   2356   SODIUM  123*  135   POTASSIUM  4.6  4.0   CHLORIDE  90*  103   CO2  20  22   GLUCOSE  995*  447*   BUN  12  10   CREATININE  1.03  0.77   CALCIUM  9.5  8.9     Recent Labs      03/24/18   1245   APTT  23.8*   INR  0.95         Recent Labs      03/24/18   2356   TRIGLYCERIDE  220*   HDL  22*   LDL  67          Assessment/Plan     * Hyperosmolar non-ketotic state in patient with type 2 diabetes mellitus (CMS-[facility])- (present on admission)   Assessment & Plan    New-onset type II diabetes  mellitus.  Sliding scale insulin  lantus  Iv fluids  Improving   Diabetic education and nutrition consult.  Diabetic diet.    Add ace/statin/asa  Stop glipizide  aqc is 16- he will need insulin  Start lantus  Glucophage ok          HECTOR (obstructive sleep apnea)   Assessment & Plan    Cpap  o2 as tolerated          Morbid obesity (CMS-HCC)   Assessment & Plan    Needs weight loss        Uncontrolled hypertension- (present on admission)   Assessment & Plan    Stop mtp  Patient is a new diabetic- start aceI and trend          Dehydration, severe- (present on admission)   Assessment & Plan    Continue IV fluids at 150 hour.  Repeat BMP in 6 hours ×2.        New onset type 2 diabetes mellitus (CMS-HCC)- (present on admission)   Assessment & Plan    Marked a1c elevation  Diabetic education .  Metformin  Ssi  lantus  Adjust prn            Saturday night palsy, right- (present on admission)   Assessment & Plan    Resolved complete immobilization of right arm  History of alcoholic drinks a night before.  Remaining numbness at the dorsum of the forearm only.  Pending MRI without contrast of brain          Quality-Core Measures   Reviewed items::  EKG reviewed, Radiology images reviewed, Labs reviewed and Medications reviewed  Wayne catheter::  No Wayne  DVT prophylaxis pharmacological::  Heparin  Ulcer Prophylaxis::  Yes  Antibiotics:  Treating active infection/contamination beyond 24 hours perioperative coverage  Assessed for rehabilitation services:  Patient was assess for and/or received rehabilitation services during this hospitalization

## 2018-03-25 NOTE — ASSESSMENT & PLAN NOTE
New-onset type II diabetes mellitus.  Continue ssi  Increase lantus  Iv fluids to continue. May be able to stop tomorrow.    Diabetic education and nutrition consult.  Diabetic diet.    Added ace/statin/asa  aqc is 16- he will need insulin  Glucophage ok

## 2018-03-25 NOTE — PROGRESS NOTES
"Report received and care assumed. Pt oriented to unit. No complaints at this time. Pt says \"Rt arm numbness and tingling is completely better now.\" No signs of distress noted. Will continue to monitor.  "

## 2018-03-25 NOTE — DIETARY
Nutrition Services     Provided pt with diabetic education and handouts from the [employer] diet manual.   Also provided pt with contact info for outpatient nutrition services.    Discussed the importance of following a diabetic diet and reviewed complications that can occur if he does not control his blood sugar.  Reviewed his HgA1c and discussed the importance of lowering his as it is currently at 16.0%; pt verbalized understanding.  Reviewed CHO counting in depth.  Pt asked appropriate questions and appears to understand information provided. Encouraged him to review the handouts provided and informed him how to contact nutrition services if any questions arise.     RD will con't to monitor per dept policy

## 2018-03-25 NOTE — CARE PLAN
Problem: Safety  Goal: Will remain free from injury  Outcome: PROGRESSING AS EXPECTED    Intervention: Provide assistance with mobility   03/24/18 1930   OTHER   Assistance / Tolerance Assistance of One     Intervention: Collaborate with Interdisciplinary Team for safe transfer and mobilization techniques   03/25/18 0010   OTHER   Assistive Devices None       Goal: Will remain free from falls  Outcome: PROGRESSING AS EXPECTED    Intervention: Assess risk factors for falls   03/24/18 1930 03/24/18 2000   OTHER   History of fall --  0   Pt Calls for Assistance Yes --

## 2018-03-25 NOTE — ASSESSMENT & PLAN NOTE
Resolved complete immobilization of right arm  History of alcoholic drinks a night before.  Remaining numbness at the dorsum of the forearm only.  Pending MRI without contrast of brain

## 2018-03-26 PROBLEM — I63.9 CVA (CEREBRAL VASCULAR ACCIDENT) (HCC): Status: ACTIVE | Noted: 2018-03-26

## 2018-03-26 LAB
GLUCOSE BLD-MCNC: 225 MG/DL (ref 65–99)
GLUCOSE BLD-MCNC: 264 MG/DL (ref 65–99)
GLUCOSE BLD-MCNC: 314 MG/DL (ref 65–99)
GLUCOSE BLD-MCNC: 320 MG/DL (ref 65–99)

## 2018-03-26 PROCEDURE — 700105 HCHG RX REV CODE 258: Performed by: HOSPITALIST

## 2018-03-26 PROCEDURE — 770006 HCHG ROOM/CARE - MED/SURG/GYN SEMI*

## 2018-03-26 PROCEDURE — 700102 HCHG RX REV CODE 250 W/ 637 OVERRIDE(OP): Performed by: HOSPITALIST

## 2018-03-26 PROCEDURE — A9270 NON-COVERED ITEM OR SERVICE: HCPCS | Performed by: INTERNAL MEDICINE

## 2018-03-26 PROCEDURE — A9270 NON-COVERED ITEM OR SERVICE: HCPCS | Performed by: HOSPITALIST

## 2018-03-26 PROCEDURE — 82962 GLUCOSE BLOOD TEST: CPT

## 2018-03-26 PROCEDURE — 700111 HCHG RX REV CODE 636 W/ 250 OVERRIDE (IP): Performed by: HOSPITALIST

## 2018-03-26 PROCEDURE — 99233 SBSQ HOSP IP/OBS HIGH 50: CPT | Performed by: HOSPITALIST

## 2018-03-26 PROCEDURE — 700102 HCHG RX REV CODE 250 W/ 637 OVERRIDE(OP): Performed by: INTERNAL MEDICINE

## 2018-03-26 RX ORDER — INSULIN GLARGINE 100 [IU]/ML
20 INJECTION, SOLUTION SUBCUTANEOUS EVERY EVENING
Status: DISCONTINUED | OUTPATIENT
Start: 2018-03-26 | End: 2018-03-27 | Stop reason: HOSPADM

## 2018-03-26 RX ORDER — ASPIRIN 81 MG/1
325 TABLET, CHEWABLE ORAL DAILY
Status: DISCONTINUED | OUTPATIENT
Start: 2018-03-26 | End: 2018-03-27 | Stop reason: HOSPADM

## 2018-03-26 RX ADMIN — ATORVASTATIN CALCIUM 40 MG: 40 TABLET, FILM COATED ORAL at 20:52

## 2018-03-26 RX ADMIN — STANDARDIZED SENNA CONCENTRATE AND DOCUSATE SODIUM 2 TABLET: 8.6; 5 TABLET, FILM COATED ORAL at 20:53

## 2018-03-26 RX ADMIN — INSULIN HUMAN 2 UNITS: 100 INJECTION, SOLUTION PARENTERAL at 06:12

## 2018-03-26 RX ADMIN — HEPARIN SODIUM 5000 UNITS: 5000 INJECTION, SOLUTION INTRAVENOUS; SUBCUTANEOUS at 20:53

## 2018-03-26 RX ADMIN — SODIUM CHLORIDE: 9 INJECTION, SOLUTION INTRAVENOUS at 06:14

## 2018-03-26 RX ADMIN — STANDARDIZED SENNA CONCENTRATE AND DOCUSATE SODIUM 2 TABLET: 8.6; 5 TABLET, FILM COATED ORAL at 08:25

## 2018-03-26 RX ADMIN — HEPARIN SODIUM 5000 UNITS: 5000 INJECTION, SOLUTION INTRAVENOUS; SUBCUTANEOUS at 06:06

## 2018-03-26 RX ADMIN — METFORMIN HYDROCHLORIDE 500 MG: 500 TABLET ORAL at 08:25

## 2018-03-26 RX ADMIN — ASPIRIN 324 MG: 81 TABLET, CHEWABLE ORAL at 08:24

## 2018-03-26 RX ADMIN — SODIUM CHLORIDE: 9 INJECTION, SOLUTION INTRAVENOUS at 00:26

## 2018-03-26 RX ADMIN — METFORMIN HYDROCHLORIDE 500 MG: 500 TABLET ORAL at 18:03

## 2018-03-26 RX ADMIN — SODIUM CHLORIDE: 9 INJECTION, SOLUTION INTRAVENOUS at 16:52

## 2018-03-26 RX ADMIN — HEPARIN SODIUM 5000 UNITS: 5000 INJECTION, SOLUTION INTRAVENOUS; SUBCUTANEOUS at 14:38

## 2018-03-26 RX ADMIN — INSULIN HUMAN 4 UNITS: 100 INJECTION, SOLUTION PARENTERAL at 12:40

## 2018-03-26 RX ADMIN — INSULIN GLARGINE 20 UNITS: 100 INJECTION, SOLUTION SUBCUTANEOUS at 20:54

## 2018-03-26 RX ADMIN — NICOTINE 14 MG: 14 PATCH, EXTENDED RELEASE TRANSDERMAL at 06:07

## 2018-03-26 RX ADMIN — INSULIN HUMAN 3 UNITS: 100 INJECTION, SOLUTION PARENTERAL at 18:01

## 2018-03-26 RX ADMIN — INSULIN HUMAN 4 UNITS: 100 INJECTION, SOLUTION PARENTERAL at 20:54

## 2018-03-26 RX ADMIN — LISINOPRIL 20 MG: 20 TABLET ORAL at 08:24

## 2018-03-26 ASSESSMENT — PATIENT HEALTH QUESTIONNAIRE - PHQ9
1. LITTLE INTEREST OR PLEASURE IN DOING THINGS: NOT AT ALL
SUM OF ALL RESPONSES TO PHQ9 QUESTIONS 1 AND 2: 0
2. FEELING DOWN, DEPRESSED, IRRITABLE, OR HOPELESS: NOT AT ALL

## 2018-03-26 ASSESSMENT — LIFESTYLE VARIABLES: DO YOU DRINK ALCOHOL: NO

## 2018-03-26 ASSESSMENT — ENCOUNTER SYMPTOMS
PALPITATIONS: 0
SORE THROAT: 0
NAUSEA: 0
DEPRESSION: 0
HEADACHES: 0
TINGLING: 0
CHILLS: 0
EYE PAIN: 0
NECK PAIN: 0
COUGH: 0
INSOMNIA: 0
HEMOPTYSIS: 0
BLURRED VISION: 0
BACK PAIN: 0
DIZZINESS: 0
ABDOMINAL PAIN: 0

## 2018-03-26 ASSESSMENT — COPD QUESTIONNAIRES
COPD SCREENING SCORE: 2
HAVE YOU SMOKED AT LEAST 100 CIGARETTES IN YOUR ENTIRE LIFE: NO/DON'T KNOW
DURING THE PAST 4 WEEKS HOW MUCH DID YOU FEEL SHORT OF BREATH: NONE/LITTLE OF THE TIME
DO YOU EVER COUGH UP ANY MUCUS OR PHLEGM?: NO/ONLY WITH OCCASIONAL COLDS OR INFECTIONS

## 2018-03-26 ASSESSMENT — PAIN SCALES - GENERAL
PAINLEVEL_OUTOF10: 0

## 2018-03-26 NOTE — ASSESSMENT & PLAN NOTE
MRI:  1.  Small patchy areas of acute infarct involving the left parietal lobe.  2.  Chronic lacunar infarcts in the right paramedian karri and cerebral peduncle.  3.  Mild diffuse cerebral substance loss.  4.  Mild microangiopathic ischemic change versus demyelination or gliosis.    On asa and statin and he does not need therapy as he has no deficits. Control bp now.

## 2018-03-26 NOTE — PROGRESS NOTES
Pt back form MRI, received orders for medical status per Dr. Chatman. Declines pain. No other concerns, complains or distress. Chart reviewed. Bed in lowest position, treaded slipper sock on, and call light within reach.

## 2018-03-26 NOTE — PROGRESS NOTES
Patient is resting comfortably in bed, no signs of distress, even unlabored breathing, will continue to monitor.

## 2018-03-26 NOTE — CONSULTS
"Diabetes Education:  Patient with new onset type 2 diabetes, HbA1c= 16.0%. Patient reports he has been \"boarderline\" diabetic on metformin 1000 mg twice daily.     Discussed basic physiology of diabetes, nutrition, exercise. oral medications, brief discussion of insulin, illness/injury, hormones, FSBG testing, preventing and treating hypoglycemia, preventing complications.  Supplied glucometer and instructed in use. Reviewed insulin types, technique for insulin administration, storage, disposal of needles.  Patient able to accurately draw and administer 20 units Normal Saline in pillow with insulin demo pen.   Taught use of insulin pens.  Insulin discharge instructions to be reviewed by RN upon discharge. Written information provided.  Recommend follow up with the [facility] [place] for Type II DM class after discharge, and follow up with PCP for medication adjustment.  I recommend patient administer all of his insulin injections for practice prior to discharge.    Upon discharge, patient will need a prescription for Lantus insulin pens, insulin pen needles 32 gauge x 4 mm, One Touch Ultra test strips and One Touch Delica lancets.  "

## 2018-03-26 NOTE — PROGRESS NOTES
[facility] Hospitalist Progress Note    Date of Service: 3/26/2018    Chief Complaint  62 y.o. male admitted 3/24/2018 with polydypsea, polyuria and right arm numbness. He has been found to be a diabetic with evidence for HONC. He was also found to have an acute cva.     Interval Problem Update  No pain  Feeling improved  Right arm sx resolved.       Consultants/Specialty  none    Disposition  Suspect home        Review of Systems   Constitutional: Positive for malaise/fatigue. Negative for chills.   HENT: Negative for sore throat.    Eyes: Negative for blurred vision and pain.   Respiratory: Negative for cough and hemoptysis.    Cardiovascular: Negative for chest pain and palpitations.   Gastrointestinal: Negative for abdominal pain and nausea.   Genitourinary: Negative for dysuria and urgency.   Musculoskeletal: Negative for back pain and neck pain.   Skin: Negative for itching and rash.   Neurological: Negative for dizziness, tingling and headaches.   Psychiatric/Behavioral: Negative for depression. The patient does not have insomnia.    All other systems reviewed and are negative.     Physical Exam  Laboratory/Imaging   Hemodynamics  Temp (24hrs), Av.5 °C (97.7 °F), Min:36 °C (96.8 °F), Max:37.2 °C (99 °F)   Temperature: 37.1 °C (98.7 °F)  Pulse  Av.2  Min: 68  Max: 118    Blood Pressure: 129/67      Respiratory      Respiration: 19, Pulse Oximetry: 95 %        RUL Breath Sounds: Clear, RML Breath Sounds: Clear, RLL Breath Sounds: Clear, OSMAR Breath Sounds: Clear, LLL Breath Sounds: Clear    Fluids    Intake/Output Summary (Last 24 hours) at 18 0992  Last data filed at 18 0723   Gross per 24 hour   Intake             1040 ml   Output              800 ml   Net              240 ml       Nutrition  Orders Placed This Encounter   Procedures   • Diet Order     Standing Status:   Standing     Number of Occurrences:   1     Order Specific Question:   Diet:     Answer:   Diabetic [3]     Physical Exam    Constitutional: He is oriented to person, place, and time. He appears well-developed and well-nourished. No distress.   HENT:   Right Ear: External ear normal.   Left Ear: External ear normal.   Nose: Nose normal.   Eyes: Right eye exhibits no discharge. Left eye exhibits no discharge. No scleral icterus.   Neck: No JVD present. No tracheal deviation present.   Cardiovascular: Normal rate, normal heart sounds and intact distal pulses.    No murmur heard.  Cap refill 2 sec   Pulmonary/Chest: Effort normal and breath sounds normal. No respiratory distress. He has no rales.   Abdominal: Soft. Bowel sounds are normal. He exhibits no distension. There is no tenderness.   obese   Musculoskeletal: He exhibits no edema or tenderness.   Neurological: He is alert and oriented to person, place, and time.   Skin: Skin is warm and dry. He is not diaphoretic. No erythema.   Normal skin color   Psychiatric: He has a normal mood and affect. His behavior is normal.   Nursing note and vitals reviewed.      Recent Labs      03/24/18   1245  03/24/18   2356   WBC  11.1*  9.1   RBC  4.93  4.50*   HEMOGLOBIN  15.5  14.2   HEMATOCRIT  46.5  40.8*   MCV  94.3  90.7   MCH  31.4  31.6   MCHC  33.3*  34.8   RDW  41.6  38.8   PLATELETCT  133*  115*   MPV  14.5*  14.3*     Recent Labs      03/24/18   1245  03/24/18   2356   SODIUM  123*  135   POTASSIUM  4.6  4.0   CHLORIDE  90*  103   CO2  20  22   GLUCOSE  995*  447*   BUN  12  10   CREATININE  1.03  0.77   CALCIUM  9.5  8.9     Recent Labs      03/24/18   1245   APTT  23.8*   INR  0.95         Recent Labs      03/24/18   2356   TRIGLYCERIDE  220*   HDL  22*   LDL  67          Assessment/Plan     * Hyperosmolar non-ketotic state in patient with type 2 diabetes mellitus (CMS-[facility])- (present on admission)   Assessment & Plan    New-onset type II diabetes mellitus.  Continue ssi  Increase lantus  Iv fluids to continue. May be able to stop tomorrow.    Diabetic education and nutrition  consult.  Diabetic diet.    Added ace/statin/asa  aqc is 16- he will need insulin  Glucophage ok          HECTOR (obstructive sleep apnea)   Assessment & Plan    Cpap  o2 as tolerated          Morbid obesity (CMS-HCC)   Assessment & Plan    Needs weight loss        Uncontrolled hypertension- (present on admission)   Assessment & Plan    Started aceI  Better control          Dehydration, severe- (present on admission)   Assessment & Plan    Continue IV fluids at 150 hour.  Repeat BMP in 6 hours ×2.        New onset type 2 diabetes mellitus (CMS-HCC)- (present on admission)   Assessment & Plan    Marked a1c elevation  Diabetic education .  Metformin  Ssi  lantus  Adjust prn            Saturday night palsy, right- (present on admission)   Assessment & Plan    Resolved complete immobilization of right arm  History of alcoholic drinks a night before.  Remaining numbness at the dorsum of the forearm only.  Pending MRI without contrast of brain          Quality-Core Measures   Reviewed items::  EKG reviewed, Radiology images reviewed, Labs reviewed and Medications reviewed  Wayne catheter::  No Wayne  DVT prophylaxis pharmacological::  Heparin  Ulcer Prophylaxis::  Yes  Antibiotics:  Treating active infection/contamination beyond 24 hours perioperative coverage  Assessed for rehabilitation services:  Patient was assess for and/or received rehabilitation services during this hospitalization

## 2018-03-26 NOTE — PROGRESS NOTES
Pt ambulated to gurney, steady, AO x4. Transferred down to MRI. Taken off box, monitor room notified.

## 2018-03-26 NOTE — CARE PLAN
Problem: Safety  Goal: Will remain free from falls    Intervention: Assess risk factors for falls  Educated patient on use of call light, no slip socks on, bed lowest position. All needs attended to. Patient verbalized understanding.         Problem: Infection  Goal: Will remain free from infection    Intervention: Implement standard precautions and perform hand washing before and after patient contact  Educated patient about hand hygiene. Verbalized understanding and can demonstrate effectively. Will continue to education patient about infection precautions and monitor for signs and symptoms of infection.

## 2018-03-27 VITALS
HEART RATE: 89 BPM | WEIGHT: 293.65 LBS | OXYGEN SATURATION: 95 % | DIASTOLIC BLOOD PRESSURE: 68 MMHG | RESPIRATION RATE: 18 BRPM | SYSTOLIC BLOOD PRESSURE: 132 MMHG | TEMPERATURE: 97.9 F | BODY MASS INDEX: 43.49 KG/M2 | HEIGHT: 69 IN

## 2018-03-27 PROBLEM — G56.31: Status: RESOLVED | Noted: 2018-03-25 | Resolved: 2018-03-27

## 2018-03-27 PROBLEM — E11.00 HYPEROSMOLAR NON-KETOTIC STATE IN PATIENT WITH TYPE 2 DIABETES MELLITUS (HCC): Status: RESOLVED | Noted: 2018-03-25 | Resolved: 2018-03-27

## 2018-03-27 PROBLEM — E86.0 DEHYDRATION, SEVERE: Status: RESOLVED | Noted: 2018-03-25 | Resolved: 2018-03-27

## 2018-03-27 LAB
ANION GAP SERPL CALC-SCNC: 7 MMOL/L (ref 0–11.9)
BUN SERPL-MCNC: 8 MG/DL (ref 8–22)
CALCIUM SERPL-MCNC: 8.9 MG/DL (ref 8.5–10.5)
CHLORIDE SERPL-SCNC: 105 MMOL/L (ref 96–112)
CO2 SERPL-SCNC: 21 MMOL/L (ref 20–33)
CREAT SERPL-MCNC: 0.64 MG/DL (ref 0.5–1.4)
GLUCOSE BLD-MCNC: 236 MG/DL (ref 65–99)
GLUCOSE BLD-MCNC: 252 MG/DL (ref 65–99)
GLUCOSE SERPL-MCNC: 218 MG/DL (ref 65–99)
POTASSIUM SERPL-SCNC: 3.8 MMOL/L (ref 3.6–5.5)
SODIUM SERPL-SCNC: 133 MMOL/L (ref 135–145)

## 2018-03-27 PROCEDURE — 700102 HCHG RX REV CODE 250 W/ 637 OVERRIDE(OP): Performed by: HOSPITALIST

## 2018-03-27 PROCEDURE — A9270 NON-COVERED ITEM OR SERVICE: HCPCS | Performed by: INTERNAL MEDICINE

## 2018-03-27 PROCEDURE — 80048 BASIC METABOLIC PNL TOTAL CA: CPT

## 2018-03-27 PROCEDURE — 700102 HCHG RX REV CODE 250 W/ 637 OVERRIDE(OP): Performed by: INTERNAL MEDICINE

## 2018-03-27 PROCEDURE — A9270 NON-COVERED ITEM OR SERVICE: HCPCS | Performed by: HOSPITALIST

## 2018-03-27 PROCEDURE — 36415 COLL VENOUS BLD VENIPUNCTURE: CPT

## 2018-03-27 PROCEDURE — 700111 HCHG RX REV CODE 636 W/ 250 OVERRIDE (IP): Performed by: HOSPITALIST

## 2018-03-27 PROCEDURE — 99239 HOSP IP/OBS DSCHRG MGMT >30: CPT | Performed by: HOSPITALIST

## 2018-03-27 PROCEDURE — 82962 GLUCOSE BLOOD TEST: CPT | Mod: 91

## 2018-03-27 RX ORDER — LISINOPRIL 20 MG/1
20 TABLET ORAL DAILY
Qty: 30 TAB | Refills: 3 | Status: SHIPPED | OUTPATIENT
Start: 2018-03-27

## 2018-03-27 RX ADMIN — NICOTINE 14 MG: 14 PATCH, EXTENDED RELEASE TRANSDERMAL at 05:21

## 2018-03-27 RX ADMIN — INSULIN HUMAN 3 UNITS: 100 INJECTION, SOLUTION PARENTERAL at 12:55

## 2018-03-27 RX ADMIN — METFORMIN HYDROCHLORIDE 500 MG: 500 TABLET ORAL at 08:16

## 2018-03-27 RX ADMIN — ASPIRIN 324 MG: 81 TABLET, CHEWABLE ORAL at 08:16

## 2018-03-27 RX ADMIN — HEPARIN SODIUM 5000 UNITS: 5000 INJECTION, SOLUTION INTRAVENOUS; SUBCUTANEOUS at 05:21

## 2018-03-27 RX ADMIN — LISINOPRIL 20 MG: 20 TABLET ORAL at 08:16

## 2018-03-27 RX ADMIN — INSULIN HUMAN 2 UNITS: 100 INJECTION, SOLUTION PARENTERAL at 05:22

## 2018-03-27 ASSESSMENT — LIFESTYLE VARIABLES: EVER_SMOKED: YES

## 2018-03-27 ASSESSMENT — PAIN SCALES - GENERAL: PAINLEVEL_OUTOF10: 0

## 2018-03-27 NOTE — PROGRESS NOTES
Jaziel updated that new order was faxed to [facility] pharmacy that his insurance will cover per Sw. All questions answered.

## 2018-03-27 NOTE — PROGRESS NOTES
Patient arrived to the floor. Patient is A & O x 4, oriented to his room, updated the white board, bed in lowest position, call light is in reach, patient's skin is intact, patient denies having any pain at this time. No signs of distress noted with the patient.

## 2018-03-27 NOTE — PROGRESS NOTES
Spoke with Dr. Gongora medical assistant who is going to pass on the message about the patients right foot pulses.

## 2018-03-27 NOTE — PROGRESS NOTES
Received report from REGGIE Gilbert on tele 7. Patient is A & O x 4, up ad mattie, ACHS, skin is intact, 20 gauge right hand Iv, and diabetic education has been done.

## 2018-03-27 NOTE — DISCHARGE INSTRUCTIONS
Discharge Instructions    Discharged to home by car with relative. Discharged via wheelchair, hospital escort: Yes.  Special equipment needed: Not Applicable    Be sure to schedule a follow-up appointment with your primary care doctor or any specialists as instructed.     Discharge Plan:   Diet Plan: Discussed  Activity Level: Discussed  Smoking Cessation Offered: Patient Counseled  Confirmed Follow up Appointment: Patient to Call and Schedule Appointment  Confirmed Symptoms Management: Discussed  Medication Reconciliation Updated: Yes  Influenza Vaccine Indication: Not indicated: Previously immunized this influenza season and > 8 years of age    I understand that a diet low in cholesterol, fat, and sodium is recommended for good health. Unless I have been given specific instructions below for another diet, I accept this instruction as my diet prescription.   Other diet: Diabetic    Special Instructions: None    · Is patient discharged on Warfarin / Coumadin?   No     Diets for Diabetes, Food Labeling  Look at food labels to help you decide how much of a product you can eat. You will want to check the amount of total carbohydrate in a serving to see how the food fits into your meal plan. In the list of ingredients, the ingredient present in the largest amount by weight must be listed first, followed by the other ingredients in descending order.  STANDARD OF IDENTITY  Most products have a list of ingredients. However, foods that the Food and Drug Administration (FDA) has given a standard of identity do not need a list of ingredients. A standard of identity means that a food must contain certain ingredients if it is called a particular name. Examples are mayonnaise, peanut butter, ketchup, jelly, and cheese.  LABELING TERMS  There are many terms found on food labels. Some of these terms have specific definitions. Some terms are regulated by the FDA, and the FDA has clearly specified how they can be used. Others are  "not regulated or well-defined and can be misleading and confusing.  SPECIFICALLY DEFINED TERMS  Nutritive Sweetener.  · A sweetener that contains calories,such as table sugar or honey.  Nonnutritive Sweetener.  · A sweetener with few or no calories,such as saccharin, aspartame, sucralose, and cyclamate.  LABELING TERMS REGULATED BY THE FDA  Free.  · The product contains only a tiny or small amount of fat, cholesterol, sodium, sugar, or calories. For example, a \"fat-free\" product will contain less than 0.5 g of fat per serving.  Low.  · A food described as \"low\" in fat, saturated fat, cholesterol, sodium, or calories could be eaten fairly often without exceeding dietary guidelines. For example, \"low in fat\" means no more than 3 g of fat per serving.  Lean.  · \"Lean\" and \"extra lean\" are U.S. Department of Agriculture (USDA) terms for use on meat and poultry products. \"Lean\" means the product contains less than 10 g of fat, 4 g of saturated fat, and 95 mg of cholesterol per serving. \"Lean\" is not as low in fat as a product labeled \"low.\"  Extra Lean.  · \"Extra lean\" means the product contains less than 5 g of fat, 2 g of saturated fat, and 95 mg of cholesterol per serving. While \"extra lean\" has less fat than \"lean,\" it is still higher in fat than a product labeled \"low.\"  Reduced, Less, Fewer.  · A diet product that contains 25% less of a nutrient or calories than the regular version. For example, hot dogs might be labeled \"25% less fat than our regular hot dogs.\"  Light/Lite.  · A diet product that contains  fewer calories or ½ the fat of the original. For example, \"light in sodium\" means a product with ½ the usual sodium.  More.  · One serving contains at least 10% more of the daily value of a vitamin, mineral, or fiber than usual.  Good Source Of.  · One serving contains 10% to 19% of the daily value for a particular vitamin, mineral, or fiber.  Excellent Source Of.  · One serving contains 20% or more of the daily " "value for a particular nutrient. Other terms used might be \"high in\" or \"rich in.\"  Enriched or Fortified.  · The product contains added vitamins, minerals, or protein. Nutrition labeling must be used on enriched or fortified foods.  Imitation.  · The product has been altered so that it is lower in protein, vitamins, or minerals than the usual food,such as imitation peanut butter.  Total Fat.  · The number listed is the total of all fat found in a serving of the product. Under total fat, food labels must list saturated fat and trans fat, which are associated with raising bad cholesterol and an increased risk of heart blood vessel disease.  Saturated Fat.  · Mainly fats from animal-based sources. Some examples are red meat, cheese, cream, whole milk, and coconut oil.  Trans Fat.  · Found in some fried snack foods, packaged foods, and fried restaurant foods. It is recommended you eat as close to 0 g of trans fat as possible, since it raises bad cholesterol and lowers good cholesterol.  Polyunsaturated and Monounsaturated Fats.  · More healthful fats. These fats are from plant sources.  Total Carbohydrate.  · The number of carbohydrate grams in a serving of the product. Under total carbohydrate are listed the other carbohydrate sources, such as dietary fiber and sugars.  Dietary Fiber.  · A carbohydrate from plant sources.  Sugars.  · Sugars listed on the label contain all naturally occurring sugars as well as added sugars.  LABELING TERMS NOT REGULATED BY THE FDA  Sugarless.  · Table sugar (sucrose) has not been added. However, the  may use another form of sugar in place of sucrose to claribel the product. For example, sugar alcohols are used to claribel foods. Sugar alcohols are a form of sugar but are not table sugar. If a product contains sugar alcohols in place of sucrose, it can still be labeled \"sugarless.\"  Low Salt, Salt-Free, Unsalted, No Salt, No Salt Added, Without Added Salt.  · Food that is " usually processed with salt has been made without salt. However, the food may contain sodium-containing additives, such as preservatives, leavening agents, or flavorings.  Natural.  · This term has no legal meaning.  Organic.  · Foods that are certified as organic have been inspected and approved by the [employer] to ensure they are produced without pesticides, fertilizers containing synthetic ingredients, bioengineering, or ionizing radiation.  Document Released: 12/20/2004 Document Revised: 03/11/2013 Document Reviewed: 07/08/2010  [employer]® Patient Information ©2014 [employer].      Correction Insulin  Correction insulin, also called corrective insulin or a supplemental dose, is a small amount of insulin that can be used to lower your blood sugar (glucose) if it is too high. You may be instructed to check your blood glucose at certain times of the day and to use correction insulin as needed to lower your blood glucose to your target range.  Correction insulin is primarily used as part of diabetes management. It may also be prescribed for people who do not have diabetes.  What is a correction scale?  A correction scale, also called a sliding scale, is prescribed by your health care provider to help you determine when you need correction insulin. Your correction scale is based on your individual treatment goals, and it has two parts:  · Ranges of blood glucose levels.  · How much correction insulin to give yourself if your blood sugar falls within a certain range.  If your blood glucose is in your desired range, you will not need correction insulin and you should take your normal insulin dose.  What type of insulin do I need?  Your health care provider may prescribe rapid-acting or short-acting insulin for you to use as correction insulin.  Rapid-acting insulin:  · Starts working quickly, in as little as 5 minutes.  · Can last for 3-6 hours.  · Works well when taken right before a meal to quickly lower blood  glucose.  Short-acting insulin:  · Starts working in about 30 minutes.  · Can last for 6-8 hours.  · Should be taken about 30 minutes before you start eating a meal.  Talk with your health care provider or pharmacist about which type of correction insulin to take and when to take it. If you use insulin to control your diabetes, you should use correction insulin in addition to the longer-acting (basal) insulin that you normally use.  How do I manage my blood glucose with correction insulin?  Giving a correction dose  · Check your blood glucose as directed by your health care provider.  · Use your correction scale to find the range that your blood glucose is in.  · Identify the units of insulin that match your blood glucose range.  · Make sure you have food available that you can eat in the next 15-30 minutes, after your correction dose.  · Give yourself the dose of correction insulin that your health care provider has prescribed in your correction scale. Always make sure you are using the right type of insulin.  ¨ If your correction insulin is rapid-acting, start eating a meal within 15 minutes after your correction dose to keep your blood glucose from getting too low.  ¨ If your correction insulin is short-acting, start eating a meal within 30 minutes after your correction dose to keep your blood glucose from getting too low.  Keeping a blood glucose log  · Write down your blood glucose test results and the amount of insulin that you give yourself. Do this every time you check blood glucose or take insulin. Bring this log with you to your medical visits. This information will help your health care provider to manage your medicines.  · Note anything that may affect your blood glucose, such as:  ¨ Changes in normal exercise or activity.  ¨ Changes in your normal schedule, such as changes in your sleep routine, going on vacation, changing your diet, or holidays.  ¨ New over-the-counter or prescription  medicines.  ¨ Illness, stress, or anxiety.  ¨ Changes in the time that you took your medicine or insulin.  ¨ Changes in your meals, such as skipping a meal, having a late meal, or dining out.  ¨ Eating things that may affect blood glucose, such as snacks, meal portions that are larger than normal, drinks that contain sugar, or eating less than usual.  What do I need to know about hyperglycemia and hypoglycemia?  What is hyperglycemia?  Hyperglycemia, also called high blood glucose, occurs when blood glucose is too high. Make sure you know the early signs of hyperglycemia, such as:  · Increased thirst.  · Hunger.  · Feeling very tired.  · Needing to urinate more often than usual.  · Blurry vision.  What is hypoglycemia?  Hypoglycemia is also called low blood glucose. Be aware of “stacking” your insulin doses. This happens when you correct a high blood glucose by giving yourself extra insulin too soon after a previous correction dose or mealtime dose. This may cause you to have too much insulin in your body and may put you at risk for hypoglycemia. Hypoglycemia occurs with a blood glucose level at or below 70 mg/dL (3.9 mmol/L).  It is important to know the symptoms of hypoglycemia and treat it right away. Always have a 15-gram rapid-acting carbohydrate snack with you to treat low blood glucose. Family members and close friends should also know the symptoms and should understand how to treat hypoglycemia, in case you are not able to treat yourself.  What are the symptoms of hypoglycemia?  Hypoglycemia symptoms can include:  · Hunger.  · Anxiety.  · Sweating and feeling clammy.  · Confusion.  · Dizziness or light-headedness.  · Sleepiness.  · Nausea.  · Increased heart rate.  · Headache.  · Blurry vision.  · Jerky movements that you cannot control (seizure).  · Nightmares.  · Tingling or numbness around the mouth, lips, or tongue.  · A change in speech.  · Decreased ability to concentrate.  · A change in  coordination.  · Restless sleep.  · Tremors or shakes.  · Fainting.  · Irritability.  How do I treat hypoglycemia?  If you are alert and able to swallow safely, follow the 15:15 rule:  · Take 15 grams of a rapid-acting carbohydrate. Rapid-acting options include:  ¨ 1 tube of glucose gel.  ¨ 3 glucose pills.  ¨ 6-8 pieces of hard candy.  ¨ 4 oz (120 mL) of fruit juice.  ¨ 4 oz (120 mL) of regular (not diet) soda.  · Check your blood glucose 15 minutes after you take the carbohydrate.  ¨ If the repeat blood glucose level is still at or below 70 mg/dL (3.9 mmol/L), take 15 grams of a carbohydrate again.  ¨ If your blood glucose level does not increase above 70 mg/dL (3.9 mmol/L) after 3 tries, seek emergency medical care.  · After your blood glucose level returns to normal, eat a meal or a snack within 1 hour.  How do I treat severe hypoglycemia?  Severe hypoglycemia is when your blood glucose level is at or below 54 mg/dL (3 mmol/L). Severe hypoglycemia is an emergency. Do not wait to see if the symptoms will go away. Get medical help right away. Call your local emergency services (911 in the [place] Do not drive yourself to the hospital.  If you have severe hypoglycemia and you cannot eat or drink, you may need an injection of glucagon. A family member or close friend should learn how to check your blood glucose and how to give you a glucagon injection. Ask your health care provider if you need to have an emergency glucagon injection kit available.  Severe hypoglycemia may need to be treated in a hospital. The treatment may include getting glucose through an IV tube. You may also need treatment for the cause of your hypoglycemia.  Why do I need correction insulin if I do not have diabetes?  If you do not have diabetes, your health care provider may prescribe insulin because:  · Keeping your blood glucose in the target range is important for your overall health.  · You are taking medicines that cause your blood glucose  to be higher than normal.  Contact a health care provider if:  · You develop low blood glucose that you are not able to treat yourself.  · You have high blood glucose that you are not able to correct with correction insulin.  · Your blood glucose is often too low.  · You used emergency glucagon to treat low blood glucose.  Get help right away if:  · You become unresponsive. If this happens, someone else should call emergency services (911 in the [place] right away.  · Your blood glucose is lower than 54 mg/dL (3.0 mmol/L).  · You become confused or you have trouble thinking clearly.  · You have difficulty breathing.  Summary  · Correction insulin is a small amount of insulin that can be used to lower your blood sugar (glucose) if it is too high.  · Talk with your health care provider or pharmacist about which type of correction insulin to take and when to take it. If you use insulin to control your diabetes, you should use correction insulin in addition to the longer-acting (basal) insulin that you normally use.  · You may be instructed to check your blood glucose at certain times of the day and to use correction insulin as needed to lower your blood glucose to your target range. Always keep a log of your blood glucose values and the amount of insulin that you used.  · It is important to know the symptoms of hypoglycemia and treat it right away. Always have a 15-gram rapid-acting carbohydrate snack with you to treat low blood glucose. Family members and close friends should also know the symptoms and should understand how to treat hypoglycemia, in case you are not able to treat yourself.  This information is not intended to replace advice given to you by your health care provider. Make sure you discuss any questions you have with your health care provider.  Document Released: 05/10/2012 Document Revised: 09/15/2017 Document Reviewed: 09/15/2017  Elsevier Interactive Patient Education © 2017 [employer]    Stroke  Prevention  Some medical conditions and behaviors are associated with an increased chance of having a stroke. You may prevent a stroke by making healthy choices and managing medical conditions.  How can I reduce my risk of having a stroke?  · Stay physically active. Get at least 30 minutes of activity on most or all days.  · Do not smoke. It may also be helpful to avoid exposure to secondhand smoke.  · Limit alcohol use. Moderate alcohol use is considered to be:  ¨ No more than 2 drinks per day for men.  ¨ No more than 1 drink per day for nonpregnant women.  · Eat healthy foods. This involves:  ¨ Eating 5 or more servings of fruits and vegetables a day.  ¨ Making dietary changes that address high blood pressure (hypertension), high cholesterol, diabetes, or obesity.  · Manage your cholesterol levels.  ¨ Making food choices that are high in fiber and low in saturated fat, trans fat, and cholesterol may control cholesterol levels.  ¨ Take any prescribed medicines to control cholesterol as directed by your health care provider.  · Manage your diabetes.  ¨ Controlling your carbohydrate and sugar intake is recommended to manage diabetes.  ¨ Take any prescribed medicines to control diabetes as directed by your health care provider.  · Control your hypertension.  ¨ Making food choices that are low in salt (sodium), saturated fat, trans fat, and cholesterol is recommended to manage hypertension.  ¨ Ask your health care provider if you need treatment to lower your blood pressure. Take any prescribed medicines to control hypertension as directed by your health care provider.  ¨ If you are 18-39 years of age, have your blood pressure checked every 3-5 years. If you are 40 years of age or older, have your blood pressure checked every year.  · Maintain a healthy weight.  ¨ Reducing calorie intake and making food choices that are low in sodium, saturated fat, trans fat, and cholesterol are recommended to manage weight.  · Stop  drug abuse.  · Avoid taking birth control pills.  ¨ Talk to your health care provider about the risks of taking birth control pills if you are over 35 years old, smoke, get migraines, or have ever had a blood clot.  · Get evaluated for sleep disorders (sleep apnea).  ¨ Talk to your health care provider about getting a sleep evaluation if you snore a lot or have excessive sleepiness.  · Take medicines only as directed by your health care provider.  ¨ For some people, aspirin or blood thinners (anticoagulants) are helpful in reducing the risk of forming abnormal blood clots that can lead to stroke. If you have the irregular heart rhythm of atrial fibrillation, you should be on a blood thinner unless there is a good reason you cannot take them.  ¨ Understand all your medicine instructions.  · Make sure that other conditions (such as anemia or atherosclerosis) are addressed.  Get help right away if:  · You have sudden weakness or numbness of the face, arm, or leg, especially on one side of the body.  · Your face or eyelid droops to one side.  · You have sudden confusion.  · You have trouble speaking (aphasia) or understanding.  · You have sudden trouble seeing in one or both eyes.  · You have sudden trouble walking.  · You have dizziness.  · You have a loss of balance or coordination.  · You have a sudden, severe headache with no known cause.  · You have new chest pain or an irregular heartbeat.  Any of these symptoms may represent a serious problem that is an emergency. Do not wait to see if the symptoms will go away. Get medical help at once. Call your local emergency services (911 in [place] Do not drive yourself to the hospital.   This information is not intended to replace advice given to you by your health care provider. Make sure you discuss any questions you have with your health care provider.  Document Released: 01/25/2006 Document Revised: 05/25/2017 Document Reviewed: 06/20/2014  [employer] Interactive Patient  Education © 2017 [employer]    Depression / Suicide Risk    As you are discharged from this [facility] facility, it is important to learn how to keep safe from harming yourself.    Recognize the warning signs:  · Abrupt changes in personality, positive or negative- including increase in energy   · Giving away possessions  · Change in eating patterns- significant weight changes-  positive or negative  · Change in sleeping patterns- unable to sleep or sleeping all the time   · Unwillingness or inability to communicate  · Depression  · Unusual sadness, discouragement and loneliness  · Talk of wanting to die  · Neglect of personal appearance   · Rebelliousness- reckless behavior  · Withdrawal from people/activities they love  · Confusion- inability to concentrate     If you or a loved one observes any of these behaviors or has concerns about self-harm, here's what you can do:  · Talk about it- your feelings and reasons for harming yourself  · Remove any means that you might use to hurt yourself (examples: pills, rope, extension cords, firearm)  · Get professional help from the community (Mental Health, Substance Abuse, psychological counseling)  · Do not be alone:Call your Safe Contact- someone whom you trust who will be there for you.  · Call your local CRISIS HOTLINE 078-2683 or 703-061-5163  · Call your local Children's Mobile Crisis Response Team [place] (154) 735-6090 or www.[employer]  · Call the toll free National Suicide Prevention Hotlines   · National Suicide Prevention Lifeline 288-291-QDGE (2410)  · National Hope Line Network 800-SUICIDE (035-1624)

## 2018-03-27 NOTE — PROGRESS NOTES
"No neuro deficits noted. Pt denies dizziness, headache. Ambulating in mendez. Gait steady. Reports having had diabetic ed and has a glucometer provided by diabetic ed. Per pt he has been doing his own FSBS and per pt he will need a \"pen\" at discharge. Dr. Gusman at bedside.   "

## 2018-03-27 NOTE — CARE PLAN
Problem: Communication  Goal: The ability to communicate needs accurately and effectively will improve  Outcome: PROGRESSING AS EXPECTED  Patient has been educated on communicating his needs accurately. Patient does verbalize his needs and concerns.    Problem: Safety  Goal: Will remain free from falls  Outcome: PROGRESSING AS EXPECTED  Patient will remain free of falls. Patient has bed alarm on and has been educated on the use of the call bell.

## 2018-03-27 NOTE — PROGRESS NOTES
Pt performed own FSBS, used sliding scale to determine coverage, brandy up insulin for vial, and administered insulin subq. Discussed at length S/S of hyper and hypoglycemia, importance of sugars being under control, and lantus and humulin R that pt is being discharged with. Pt reports adequate understanding of coverage insulin and states he feels comfortable administering and following sugars at home. Discussed diabetic diet as well. Pt states he lives with his fiance who can help with his medications and he has friends with diabetes. Awaiting ride home.

## 2018-03-28 ENCOUNTER — PATIENT OUTREACH (OUTPATIENT)
Dept: HEALTH INFORMATION MANAGEMENT | Facility: OTHER | Age: 63
End: 2018-03-28

## 2018-03-28 NOTE — DISCHARGE SUMMARY
CHIEF COMPLAINT ON ADMISSION  Chief Complaint   Patient presents with   • Numbness       CODE STATUS  Prior    HPI & HOSPITAL COURSE  62 y.o. male who presented 3/24/2018 with prior history of diabetes mellitus, family history significant for type II diabetes presents with one-week history of extreme thirst excessive urination, decreased appetite. The patient states that he drink 2 shots of alcohol last night and fell asleep he awoke with right arm complete numbness inability to move it at 0500 while lying in bed upon awakening. He states that he does drink alcohol but never more than 2 drinks a day he currently lives with his fiancée. He recently was treated with 9 days of Augmentin for a sinus infection. He does have obstructive sleep apnea on CPAP which he is compliant. He states that he is now able to move his right arm however earlier today it dropped at his side only. His tingling numbness also is improving. He denies any other neurological deficits.       He  Was agressively hydrated. We was started on lantus and ssi. He received diabetic education. His blood pressure controlled.    His right arm symptoms completely resolved. He received pt and ot in the hospital. He was started on asa daily . He was already on lipitor.Mri brain showed some leftparietal area infarct. He did not have any focal deficits upon discharge so he did not need outpatient pt, ot or speech.    The patient met 2-midnight criteria for an inpatient stay at the time of discharge.    Therefore, he is discharged in good and stable condition with close outpatient follow-up.    SPECIFIC OUTPATIENT FOLLOW-UP  Stroke clinic 2 weeks    pcp 1 week    DISCHARGE PROBLEM LIST  Principal Problem (Resolved):    Hyperosmolar non-ketotic state in patient with type 2 diabetes mellitus (CMS-HCC) POA: Yes  Active Problems:    New onset type 2 diabetes mellitus (CMS-HCC) POA: Yes    Uncontrolled hypertension POA: Yes    Morbid obesity (CMS-[facility]) POA: Unknown     HECTOR (obstructive sleep apnea) POA: Yes    Acute CVA (cerebral vascular accident) (CMS-[facility]) POA: yes  Resolved Problems:    Saturday night palsy, right POA: Yes    Dehydration, severe POA: Yes    FOLLOW UP  No future appointments.  Anil Gaming M.D.  [address]  [address]  [place] [address]  [phone]    Schedule an appointment as soon as possible for a visit in 1 week        MEDICATIONS ON DISCHARGE   Jaziel Aguilar   Home Medication Instructions [id number]    Printed on:03/28/18 5023   Medication Information                      aspirin EC (ECOTRIN) 81 MG Tablet Delayed Response  Take 1 Tab by mouth every day.             atorvastatin (LIPITOR) 40 MG Tab  Take 40 mg by mouth every evening.             gabapentin (NEURONTIN) 300 MG Cap  Take 300 mg by mouth 3 times a day.             glucose blood strip  1 Strip by Other route as needed.             insulin glargine (LANTUS) 100 UNIT/ML Solution Pen-injector injection  Inject 20 Units as instructed every evening.             Insulin Pen Needle 32G X 4 MM Misc  Use as directed             insulin regular (HUMULIN R) 100 Unit/mL Solution  Glucose  151 - 200  mg/dL =   1 Units  201 - 250  mg/dL =    2U    251 - 300 =   3U    301 - 350 =   4U  351 - 400=   5U  > 400 = 6 Units             lisinopril (PRINIVIL) 20 MG Tab  Take 1 Tab by mouth every day.             metformin (GLUCOPHAGE) 1000 MG tablet  Take 1,000 mg by mouth 2 times a day.             metoprolol SR (TOPROL XL) 100 MG TABLET SR 24 HR  Take 100 mg by mouth every day.             ONETOUCH DELICA LANCETS FINE Misc  1 Each by Other route as needed.             vitamin D, Ergocalciferol, (DRISDOL) 00760 units Cap capsule  Take 50,000 Units by mouth every 7 days.                 DIET  No orders of the defined types were placed in this encounter.      ACTIVITY  As tolerated.  Weight bearing as tolerated      CONSULTATIONS  none    PROCEDURES  Patient Information     Patient Name  Jaziel Aguilar ([id number])  Sex  Male   1955   Room Bed Code Status Current Location   S616 01 Prior STEVIE [facility]ARTIN   Reprint Order Requisition     MR-BRAIN-W/O (Order #[id number]) on 3/24/18   Last Resulted Time   Mon Mar 26, 2018  1:12 AM   Images     Show images for MR-BRAIN-W/O   Imaging Result Status     Status: Final result (Exam End: 3/25/2018  7:43 PM)   Imaging Previous Results     Open Hard Copy Result Report (Order #[id number] - MR-BRAIN-W/O)   Narrative       3/25/2018 7:09 PM    HISTORY/REASON FOR EXAM:  Persistent right arm numbness.    TECHNIQUE/EXAM DESCRIPTION AND NUMBER OF VIEWS:  MRI of the brain without contrast.    The study was performed on a [employer]. 1.5 [employer] MRI scanner. Spoiled-GRASS sagittal, thin-section T2 fast spin-echo axial, T1 coronal, and FLAIR coronal images were obtained of the whole brain.    FINDINGS:  The calvariae are normal. There are no extra-axial fluid collections. There is a pattern of mild cerebral atrophy manifest as prominence of sulcal markings over the convexities and vertex along with mild ventriculomegaly.    There are small patchy areas of restricted diffusion in the left parietal lobe consistent with acute infarcts. There are small chronic lacunar infarcts in the right paramedian karri and cerebral peduncle. There is a small linear area of gradient echo   signal hypointensity in  hemosiderin deposition, likely related to chronic hemorrhagic infarct There is a pattern of mild supratentorial white matter disease with scattered foci of bright T2 and FLAIR signal in the subcortical and deep white matter of   both hemispheres consistent with small vessel ischemic change versus demyelination or gliosis.  There is no mass effect or midline shift. There are no hemorrhagic lesions. The brainstem and posterior fossa structures are unremarkable.    Vascular flow voids in the carotid and vertebrobasilar arteries, Andre[place]ski of [place], and dural venous sinuses are intact. The visualized paranasal sinuses and  mastoid air cells appear clear.   Impression       1.  Small patchy areas of acute infarct involving the left parietal lobe.  2.  Chronic lacunar infarcts in the right paramedian karri and cerebral peduncle.  3.  Mild diffuse cerebral substance loss.  4.  Mild microangiopathic ischemic change versus demyelination or gliosis.         LABORATORY  Lab Results   Component Value Date/Time    SODIUM 133 (L) 03/27/2018 01:05 AM    POTASSIUM 3.8 03/27/2018 01:05 AM    CHLORIDE 105 03/27/2018 01:05 AM    CO2 21 03/27/2018 01:05 AM    GLUCOSE 218 (H) 03/27/2018 01:05 AM    BUN 8 03/27/2018 01:05 AM    CREATININE 0.64 03/27/2018 01:05 AM    CREATININE 0.8 12/07/2005 08:40 PM        Lab Results   Component Value Date/Time    WBC 9.1 03/24/2018 11:56 PM    HEMOGLOBIN 14.2 03/24/2018 11:56 PM    HEMATOCRIT 40.8 (L) 03/24/2018 11:56 PM    PLATELETCT 115 (L) 03/24/2018 11:56 PM        Total time of the discharge process exceeds 40 minutes

## 2021-03-15 DIAGNOSIS — Z23 NEED FOR VACCINATION: ICD-10-CM

## 2025-01-02 ENCOUNTER — HOSPITAL ENCOUNTER (OUTPATIENT)
Facility: MEDICAL CENTER | Age: 70
End: 2025-01-02
Attending: PHYSICIAN ASSISTANT
Payer: MEDICARE

## 2025-01-02 LAB
FORWARD REASON: SPWHY: NORMAL
FORWARDED TO LAB: SPWHR: NORMAL
SPECIMEN SENT: SPWT1: NORMAL